# Patient Record
Sex: MALE | Race: WHITE | NOT HISPANIC OR LATINO | Employment: OTHER | URBAN - METROPOLITAN AREA
[De-identification: names, ages, dates, MRNs, and addresses within clinical notes are randomized per-mention and may not be internally consistent; named-entity substitution may affect disease eponyms.]

---

## 2018-02-03 ENCOUNTER — HOSPITAL ENCOUNTER (EMERGENCY)
Facility: HOSPITAL | Age: 83
Discharge: HOME | End: 2018-02-04
Attending: EMERGENCY MEDICINE
Payer: MEDICARE

## 2018-02-03 VITALS
RESPIRATION RATE: 22 BRPM | DIASTOLIC BLOOD PRESSURE: 67 MMHG | SYSTOLIC BLOOD PRESSURE: 156 MMHG | HEART RATE: 80 BPM | TEMPERATURE: 98 F | OXYGEN SATURATION: 96 %
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
## 2018-02-03 DIAGNOSIS — S09.90XA TRAUMATIC INJURY OF HEAD, INITIAL ENCOUNTER: ICD-10-CM

## 2018-02-03 PROCEDURE — 99285 EMERGENCY DEPT VISIT HI MDM: CPT | Mod: ,,, | Performed by: EMERGENCY MEDICINE

## 2018-02-03 PROCEDURE — 93010 ELECTROCARDIOGRAM REPORT: CPT | Mod: ,,, | Performed by: INTERNAL MEDICINE

## 2018-02-03 PROCEDURE — 93005 ELECTROCARDIOGRAM TRACING: CPT

## 2018-02-03 PROCEDURE — 99284 EMERGENCY DEPT VISIT MOD MDM: CPT | Mod: 25

## 2018-02-04 NOTE — ED PROVIDER NOTES
Encounter Date: 2/3/2018    SCRIBE #1 NOTE: I, Sabrina Gaffney, am scribing for, and in the presence of,  Dr. Mckeon. I have scribed the entire note.       History     Chief Complaint   Patient presents with    Fall     pt presents to the ed following a fall. pt has hematoma to right orbit.. no loc      Time patient was seen by the provider: 8:14 PM      The patient is a 89 y.o. male with co-morbidities including: arthritis, cancer, HTN, HLD, CAD, dementia, and seizures, who presents to the ED with a complaint of a fall and a right cheek hematoma. He has dementia and does not remember the fall. He denies chest pain and SOB.        The history is provided by the patient, medical records and the EMS personnel. The history is limited by the condition of the patient. No  was used.     Review of patient's allergies indicates:  No Known Allergies  Past Medical History:   Diagnosis Date    Adjustment disorder with depressed mood     Arthritis     Atrioventricular block     Bipolar disorder     Bradycardia     Cancer     Cataract     OU    Coronary artery disease     Dementia     Depression     HEARING LOSS     High cholesterol     Hypertension     Orthostatic hypotension     Seizures      Past Surgical History:   Procedure Laterality Date    ADENOIDECTOMY      CARDIAC PACEMAKER PLACEMENT      COLONOSCOPY      TONSILLECTOMY       History reviewed. No pertinent family history.  Social History   Substance Use Topics    Smoking status: Former Smoker     Quit date: 3/13/1982    Smokeless tobacco: Never Used    Alcohol use No     Review of Systems   Unable to perform ROS: Dementia       Physical Exam     Initial Vitals [02/03/18 1927]   BP Pulse Resp Temp SpO2   (!) 160/81 102 18 98.2 °F (36.8 °C) 98 %      MAP       107.33         Physical Exam    Nursing note and vitals reviewed.  Constitutional: He appears well-developed and well-nourished. He is not diaphoretic. No distress.    HENT:   Mouth/Throat: Oropharynx is clear and moist.   Right inferior periorbital area ecchymosis. No other ecchymoses.    Eyes: Pupils are equal, round, and reactive to light.   Neck: Normal range of motion. Neck supple. No JVD present.   No midline cervical tenderness.   Cardiovascular: Normal rate, regular rhythm, normal heart sounds and intact distal pulses.   Pulmonary/Chest: Breath sounds normal. No respiratory distress. He has no wheezes. He has no rhonchi. He has no rales.   Abdominal: Soft. He exhibits no distension. There is no tenderness.   Musculoskeletal: Normal range of motion.   No hip instability. No peripheral edema.   Lymphadenopathy:     He has no cervical adenopathy.   Neurological: No cranial nerve deficit or sensory deficit.   Awake and can answer simple questions. Otherwise not oriented to place or time secondary to dementia. 4+/5 strength in lower extremities.    Skin: Skin is warm and dry.         ED Course   Procedures  Labs Reviewed - No data to display  EKG Readings: (Independently Interpreted)   Ventricular paced. Rate: 81.       X-Rays:   Independently Interpreted Readings:   Head CT: No acute intracranial hemorrhage or skull fracture.   Other Readings:  Cervical spine CT: no acute fracture or subluxation.  Maxillofacial CT: no acute fracture.    Medical Decision Making:   History:   Old Medical Records: I decided to obtain old medical records.  Initial Assessment:   Emergent 89 y.o. male status post fall presents with head injury. My differential diagnosis includes but is not limited to: intracranial hemorrhage, contusion, concussion, and cervical fracture. Patient appears to be at baseline mental status. CT imaging ordered. Will continue to monitor.    10:43 PM  Imaging reviewed with no significant abnormality. He appears to be at his baseline mental status. Vital signs are stable. Stable for discharge back to nursing facility.  Independently Interpreted Test(s):   I have ordered  and independently interpreted X-rays - see prior notes.  I have ordered and independently interpreted EKG Reading(s) - see prior notes  Clinical Tests:   Radiological Study: Ordered and Reviewed  Medical Tests: Ordered and Reviewed            Scribe Attestation:   Scribe #1: I performed the above scribed service and the documentation accurately describes the services I performed. I attest to the accuracy of the note.    Attending Attestation:           Physician Attestation for Scribe:      Comments: I, Dr. Kristy Mckeon, personally performed the services described in this documentation. All medical record entries made by the scribe were at my direction and in my presence.  I have reviewed the chart and agree that the record reflects my personal performance and is accurate and complete. Kristy Mckeon MD.              ED Course      Clinical Impression:   The primary encounter diagnosis was Fall, initial encounter. A diagnosis of Traumatic injury of head, initial encounter was also pertinent to this visit.    Disposition:   Disposition: Discharged  Condition: Stable                        Kristy Mckeon MD  02/04/18 1189

## 2018-02-04 NOTE — ED NOTES
Patient placed on continuous cardiac monitor, automatic blood pressure cuff and continuous pulse oximeter. Pt gowned

## 2018-02-04 NOTE — ED NOTES
Adult Physical Assessment  LOC: Jesus Beckham, 89 y.o. male verified via two identifiers.  The patient is awake, alert, oriented to time and self and speaking appropriately at this time.  APPEARANCE: Patient resting comfortably and appears to be in no acute distress at this time. Patient is clean and well groomed  SKIN:The skin is warm and dry, color consistent with ethnicity, patient has normal skin turgor and moist mucus membranes, skin intact, no breakdown or brusing noted.  MUSCULOSKELETAL: Patient moving all extremities well, swelling and ecchymosis noted to right knee, tender to touch  RESPIRATORY: Airway is open and patent, respirations are spontaneous, patient has a normal effort and rate, no accessory muscle use noted.  CARDIAC: Peripheral edema noted  ABDOMEN: Soft and non tender to palpation, no abdominal distention noted.  NEUROLOGIC: Eyes open spontaneously, follows commands, facial expression symmetrical, bilateral hand grasp equal and even, purposeful motor response noted, normal sensation in all extremities when touched with a finger.

## 2018-05-10 ENCOUNTER — HOSPITAL ENCOUNTER (EMERGENCY)
Facility: HOSPITAL | Age: 83
Discharge: HOME | End: 2018-05-11
Attending: EMERGENCY MEDICINE
Payer: MEDICARE

## 2018-05-10 DIAGNOSIS — S09.90XA INJURY OF HEAD, INITIAL ENCOUNTER: Primary | ICD-10-CM

## 2018-05-10 DIAGNOSIS — M25.552 LEFT HIP PAIN: ICD-10-CM

## 2018-05-10 DIAGNOSIS — M25.562 LEFT KNEE PAIN: ICD-10-CM

## 2018-05-10 DIAGNOSIS — M25.551 RIGHT HIP PAIN: ICD-10-CM

## 2018-05-10 PROCEDURE — 99285 EMERGENCY DEPT VISIT HI MDM: CPT | Mod: ,,, | Performed by: PHYSICIAN ASSISTANT

## 2018-05-10 PROCEDURE — 99284 EMERGENCY DEPT VISIT MOD MDM: CPT | Mod: 25

## 2018-05-11 VITALS
DIASTOLIC BLOOD PRESSURE: 84 MMHG | OXYGEN SATURATION: 98 % | WEIGHT: 160 LBS | SYSTOLIC BLOOD PRESSURE: 174 MMHG | BODY MASS INDEX: 22.32 KG/M2 | HEART RATE: 66 BPM | RESPIRATION RATE: 18 BRPM

## 2018-05-11 LAB
BUN SERPL-MCNC: 18 MG/DL (ref 6–30)
CHLORIDE SERPL-SCNC: 101 MMOL/L (ref 95–110)
CREAT SERPL-MCNC: 0.8 MG/DL (ref 0.5–1.4)
GLUCOSE SERPL-MCNC: 102 MG/DL (ref 70–110)
HCT VFR BLD CALC: 40 %PCV (ref 36–54)
POC IONIZED CALCIUM: 1.04 MMOL/L (ref 1.06–1.42)
POC TCO2 (MEASURED): 26 MMOL/L (ref 23–29)
POTASSIUM BLD-SCNC: 4.6 MMOL/L (ref 3.5–5.1)
SAMPLE: ABNORMAL
SODIUM BLD-SCNC: 137 MMOL/L (ref 136–145)

## 2018-05-11 NOTE — ED NOTES
SPD contacted for return transport to [facility]. Dispatch states that they will be here within.

## 2018-05-11 NOTE — DISCHARGE INSTRUCTIONS
Follow up with primary care doctor  Clean the wound on the head daily with soap and water Steri-Strips will fall off over the next couple of days  Return to the emergency department for any new symptoms    Our goal in the emergency department is to always give you outstanding care and exceptional service. You may receive a survey by mail or e-mail in the next week regarding your experience in our ED. We would greatly appreciate your completing and returning the survey. Your feedback provides us with a way to recognize our staff who give very good care and it helps us learn how to improve when your experience was below our aspiration of excellence.

## 2018-05-11 NOTE — ED NOTES
Pt arrives via EMS with c-collar in place. Pt reports fall and unaware how he fell. Pt disoriented to time. Pt denies head pain, dizziness, N/V, neck pain, back pain, chest pain, SOB. Pt hard of hearing.     Patient identifiers verified and correct for Jesus Beckham.    LOC: The patient is awake, alert and aware of environment with an appropriate affect, the patient is disoriented to time and speaking appropriately.  APPEARANCE: Patient resting comfortably and in no acute distress, patient is clean and well groomed, patient's clothing is properly fastened. Pt has c-collar in place.   SKIN: The skin is warm and dry, color consistent with ethnicity, patient has normal skin turgor and moist mucus membranes, skin intact, no breakdown or bruising noted.  MUSCULOSKELETAL: Patient moving all extremities spontaneously, no obvious swelling or deformities noted.  RESPIRATORY: Airway is open and patent, respirations are spontaneous, patient has a normal effort and rate, no accessory muscle use noted  CARDIAC: Patient has a normal rate and regular rhythm, no periphreal edema noted, capillary refill < 3 seconds.  ABDOMEN: Soft and non tender to palpation, no distention noted, normoactive bowel sounds present in all four quadrants.  NEUROLOGIC: PERRL, 3mm bilaterally, eyes open spontaneously, behavior appropriate to situation, follows commands, facial expression symmetrical, bilateral hand grasp equal and even, purposeful motor response noted, normal sensation in all extremities when touched with a finger.
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
## 2018-08-22 ENCOUNTER — HOSPITAL ENCOUNTER (EMERGENCY)
Facility: HOSPITAL | Age: 83
Discharge: HOME | End: 2018-08-22
Attending: EMERGENCY MEDICINE
Payer: MEDICARE

## 2018-08-22 VITALS
OXYGEN SATURATION: 99 % | BODY MASS INDEX: 23.8 KG/M2 | SYSTOLIC BLOOD PRESSURE: 158 MMHG | WEIGHT: 170 LBS | TEMPERATURE: 98 F | DIASTOLIC BLOOD PRESSURE: 66 MMHG | HEIGHT: 71 IN | HEART RATE: 60 BPM | RESPIRATION RATE: 16 BRPM

## 2018-08-22 DIAGNOSIS — T14.8XXA ABRASION: Primary | ICD-10-CM

## 2018-08-22 PROCEDURE — 99284 EMERGENCY DEPT VISIT MOD MDM: CPT

## 2018-08-22 PROCEDURE — 99283 EMERGENCY DEPT VISIT LOW MDM: CPT | Mod: ,,, | Performed by: EMERGENCY MEDICINE
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
## 2018-08-22 NOTE — ED NOTES
Patient d/c via wheelchair with KRISHAN escorting patient back to [facility]. Respirations even and unlabored. NO s/s of distress noted. Stable condition observed.

## 2018-09-21 ENCOUNTER — HOSPITAL ENCOUNTER (INPATIENT)
Facility: HOSPITAL | Age: 83
LOS: 4 days | Discharge: NURSING, REHAB OR LONG-TERM CARE | DRG: 918 | End: 2018-09-25
Attending: EMERGENCY MEDICINE | Admitting: EMERGENCY MEDICINE
Payer: MEDICARE

## 2018-09-21 DIAGNOSIS — Z71.89 GOALS OF CARE, COUNSELING/DISCUSSION: ICD-10-CM

## 2018-09-21 DIAGNOSIS — Z51.5 PALLIATIVE CARE ENCOUNTER: ICD-10-CM
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
## 2018-09-21 DIAGNOSIS — T42.0X4D: ICD-10-CM

## 2018-09-21 PROBLEM — T42.0X4A PHENYTOIN POISONING OF UNDETERMINED INTENT: Status: ACTIVE | Noted: 2018-09-21

## 2018-09-21 PROBLEM — R41.82 ALTERED MENTAL STATUS: Status: ACTIVE | Noted: 2018-09-21

## 2018-09-21 LAB
ALBUMIN SERPL BCP-MCNC: 3.6 G/DL
ALP SERPL-CCNC: 120 U/L
ALT SERPL W/O P-5'-P-CCNC: 19 U/L
ANION GAP SERPL CALC-SCNC: 11 MMOL/L
AST SERPL-CCNC: 34 U/L
BASOPHILS # BLD AUTO: 0.04 K/UL
BASOPHILS NFR BLD: 0.4 %
BILIRUB SERPL-MCNC: 0.6 MG/DL
BUN SERPL-MCNC: 14 MG/DL
CALCIUM SERPL-MCNC: 8.7 MG/DL
CHLORIDE SERPL-SCNC: 106 MMOL/L
CO2 SERPL-SCNC: 22 MMOL/L
CREAT SERPL-MCNC: 0.8 MG/DL
DIFFERENTIAL METHOD: ABNORMAL
EOSINOPHIL # BLD AUTO: 0.1 K/UL
EOSINOPHIL NFR BLD: 0.7 %
ERYTHROCYTE [DISTWIDTH] IN BLOOD BY AUTOMATED COUNT: 13.3 %
EST. GFR  (AFRICAN AMERICAN): >60 ML/MIN/1.73 M^2
EST. GFR  (NON AFRICAN AMERICAN): >60 ML/MIN/1.73 M^2
GLUCOSE SERPL-MCNC: 103 MG/DL
HCT VFR BLD AUTO: 42.9 %
HGB BLD-MCNC: 14.4 G/DL
IMM GRANULOCYTES # BLD AUTO: 0.04 K/UL
IMM GRANULOCYTES NFR BLD AUTO: 0.4 %
INR PPP: 1.1
LYMPHOCYTES # BLD AUTO: 1.9 K/UL
LYMPHOCYTES NFR BLD: 19.5 %
MCH RBC QN AUTO: 32.5 PG
MCHC RBC AUTO-ENTMCNC: 33.6 G/DL
MCV RBC AUTO: 97 FL
MONOCYTES # BLD AUTO: 1.5 K/UL
MONOCYTES NFR BLD: 15.6 %
NEUTROPHILS # BLD AUTO: 6.1 K/UL
NEUTROPHILS NFR BLD: 63.4 %
NRBC BLD-RTO: 0 /100 WBC
PHENYTOIN SERPL-MCNC: 26.2 UG/ML
PLATELET # BLD AUTO: 168 K/UL
PMV BLD AUTO: 10.2 FL
POCT GLUCOSE: 103 MG/DL (ref 70–110)
POTASSIUM SERPL-SCNC: 4.1 MMOL/L
PROT SERPL-MCNC: 7.6 G/DL
PROTHROMBIN TIME: 11.1 SEC
RBC # BLD AUTO: 4.43 M/UL
SODIUM SERPL-SCNC: 139 MMOL/L
TROPONIN I SERPL DL<=0.01 NG/ML-MCNC: <0.006 NG/ML
TSH SERPL DL<=0.005 MIU/L-ACNC: 1.25 UIU/ML
WBC # BLD AUTO: 9.67 K/UL

## 2018-09-21 PROCEDURE — 99284 EMERGENCY DEPT VISIT MOD MDM: CPT | Mod: ,,, | Performed by: EMERGENCY MEDICINE

## 2018-09-21 PROCEDURE — 99222 1ST HOSP IP/OBS MODERATE 55: CPT | Mod: ,,, | Performed by: HOSPITALIST

## 2018-09-21 PROCEDURE — 25000003 PHARM REV CODE 250: Performed by: STUDENT IN AN ORGANIZED HEALTH CARE EDUCATION/TRAINING PROGRAM

## 2018-09-21 PROCEDURE — 85025 COMPLETE CBC W/AUTO DIFF WBC: CPT

## 2018-09-21 PROCEDURE — 80053 COMPREHEN METABOLIC PANEL: CPT

## 2018-09-21 PROCEDURE — 85610 PROTHROMBIN TIME: CPT

## 2018-09-21 PROCEDURE — 80185 ASSAY OF PHENYTOIN TOTAL: CPT

## 2018-09-21 PROCEDURE — 84443 ASSAY THYROID STIM HORMONE: CPT

## 2018-09-21 PROCEDURE — 11000001 HC ACUTE MED/SURG PRIVATE ROOM

## 2018-09-21 PROCEDURE — 84484 ASSAY OF TROPONIN QUANT: CPT

## 2018-09-21 RX ORDER — IBUPROFEN 200 MG
16 TABLET ORAL
Status: DISCONTINUED | OUTPATIENT
Start: 2018-09-21 | End: 2018-09-25 | Stop reason: HOSPADM

## 2018-09-21 RX ORDER — GLUCAGON 1 MG
1 KIT INJECTION
Status: DISCONTINUED | OUTPATIENT
Start: 2018-09-21 | End: 2018-09-25 | Stop reason: HOSPADM

## 2018-09-21 RX ORDER — PHENYTOIN SODIUM 100 MG/1
200 CAPSULE, EXTENDED RELEASE ORAL 2 TIMES DAILY
Status: DISCONTINUED | OUTPATIENT
Start: 2018-09-21 | End: 2018-09-21

## 2018-09-21 RX ORDER — SERTRALINE HYDROCHLORIDE 25 MG/1
25 TABLET, FILM COATED ORAL NIGHTLY
Status: DISCONTINUED | OUTPATIENT
Start: 2018-09-21 | End: 2018-09-21

## 2018-09-21 RX ORDER — SODIUM CHLORIDE 0.9 % (FLUSH) 0.9 %
5 SYRINGE (ML) INJECTION
Status: DISCONTINUED | OUTPATIENT
Start: 2018-09-21 | End: 2018-09-25 | Stop reason: HOSPADM

## 2018-09-21 RX ORDER — TAMSULOSIN HYDROCHLORIDE 0.4 MG/1
0.4 CAPSULE ORAL DAILY
Status: DISCONTINUED | OUTPATIENT
Start: 2018-09-21 | End: 2018-09-25 | Stop reason: HOSPADM

## 2018-09-21 RX ORDER — ASCORBIC ACID 500 MG
500 TABLET ORAL NIGHTLY
Status: DISCONTINUED | OUTPATIENT
Start: 2018-09-21 | End: 2018-09-25 | Stop reason: HOSPADM

## 2018-09-21 RX ORDER — DONEPEZIL HYDROCHLORIDE 5 MG/1
5 TABLET, FILM COATED ORAL NIGHTLY
Status: DISCONTINUED | OUTPATIENT
Start: 2018-09-21 | End: 2018-09-25 | Stop reason: HOSPADM

## 2018-09-21 RX ORDER — IBUPROFEN 200 MG
24 TABLET ORAL
Status: DISCONTINUED | OUTPATIENT
Start: 2018-09-21 | End: 2018-09-25 | Stop reason: HOSPADM

## 2018-09-21 RX ORDER — QUETIAPINE FUMARATE 25 MG/1
25 TABLET, FILM COATED ORAL 2 TIMES DAILY
Status: DISCONTINUED | OUTPATIENT
Start: 2018-09-21 | End: 2018-09-21

## 2018-09-21 RX ORDER — LEVETIRACETAM 500 MG/1
500 TABLET ORAL 2 TIMES DAILY
Status: DISCONTINUED | OUTPATIENT
Start: 2018-09-21 | End: 2018-09-25 | Stop reason: HOSPADM

## 2018-09-21 RX ADMIN — LEVETIRACETAM 500 MG: 500 TABLET ORAL at 09:09

## 2018-09-21 RX ADMIN — OXYCODONE HYDROCHLORIDE AND ACETAMINOPHEN 500 MG: 500 TABLET ORAL at 09:09

## 2018-09-21 RX ADMIN — DONEPEZIL HYDROCHLORIDE 5 MG: 5 TABLET, FILM COATED ORAL at 09:09

## 2018-09-21 RX ADMIN — TAMSULOSIN HYDROCHLORIDE 0.4 MG: 0.4 CAPSULE ORAL at 01:09

## 2018-09-21 RX ADMIN — LEVETIRACETAM 500 MG: 500 TABLET ORAL at 01:09

## 2018-09-21 NOTE — NURSING
"Pt bed alarm went off.  Upon arrival to room, pt sitting on floor, no obvious signs of trauma, no LOC, no abrasions, bruising or lacerations noted.  Side rails up x 3.  Awake Alert and Oriented to person and place. VSS, stated he "had to get up to go to his house"  Primary MD paged to notify of fall.  "

## 2018-09-21 NOTE — ED NOTES
Hourly rounding complete. Patient resting in stretcher and is in NAD at this time. Pt is easily arouses to verbal stimuli, VSS, respirations even and unlabored. Pt denies pain at this time. Pt updated on POC. Bed low and locked with side rails up x2, call bell in pt reach. Pt voices no needs at this time.
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
## 2018-09-21 NOTE — ED NOTES
Hourly rounding complete. Patient resting in stretcher and is in NAD at this time. Pt is easily aroused to verbal stimuli. respirations even and unlabored. Pt denies pain at this time. Assisted pt. With bedpan and urinal. Pt updated on POC. Bed low and locked with side rails up x2, call bell in pt reach. Pt voices no needs at this time.

## 2018-09-21 NOTE — H&P
"[facility] Hospital Medicine  History & Physical    Patient Name: Jesus Beckham  MRN: [id number]  Admission Date: 9/21/2018  Attending Physician: Mala Peoples MD   Primary Care Provider: Tulio Tovar MD    [facility] Medicine Team: [facility] HOSP MED 4 Darwin Pearson MD     Patient information was obtained from patient and ER records.     Subjective:     Principal Problem:Fall    Chief Complaint:   Chief Complaint   Patient presents with    Fall     Pt presented to the via [facility]. Pt c/o AMS d/t increased pain medicine and patient fell out of the bed and hit his head. Pt has a hemotoma noted to right forehead.         HPI: 89M with CAD, dementia, bipolar d/o, seizure d/o, pacemaker (heart block) presents from nursing home with AMS and fall. Patient reportedly fell at nursing home today and unclear if LOC occurred. He states that he "tripped" getting out of bed and he did not hit his head, but he is confused and somnolent. He denies pain. Patient also presented to the ED yesterday for a fall while walking to the dining [place]mond, and previous note remarks that nursing staff feels he is more lethargic and cannot ambulate as well as normal. Staff denies seizure-like activity, LOC.  In ED, he was noted to have supratherapeutic phenytoin levels. CT-head and c-spine negative, but a hematoma to the R side of the head was seen.    Past Medical History:   Diagnosis Date    Adjustment disorder with depressed mood     Arthritis     Atrioventricular block     Bipolar disorder     Bradycardia     Cancer     Cataract     OU    Coronary artery disease     Dementia     Depression     HEARING LOSS     High cholesterol     Hypertension     Orthostatic hypotension     Seizures        Past Surgical History:   Procedure Laterality Date    ADENOIDECTOMY      CARDIAC PACEMAKER PLACEMENT      COLONOSCOPY      INSERTION-PACEMAKER-DUAL Left 12/28/2015    Performed by [facility]iel [facility] at [facility] CATH LAB "    TONSILLECTOMY         Review of patient's allergies indicates:  No Known Allergies    No current facility-administered medications on file prior to encounter.      Current Outpatient Medications on File Prior to Encounter   Medication Sig    acetaminophen (TYLENOL) 325 MG tablet Take 325 mg by mouth every 8 (eight) hours as needed for Pain.    ascorbic acid (VITAMIN C) 500 MG tablet Take 500 mg by mouth every evening.    bisacodyl (DULCOLAX, BISACODYL,) 10 mg Supp Place 10 mg rectally daily as needed.    CALCIUM CARBONATE (CALCIUM 600 ORAL) Take 2 tablets by mouth once daily.    donepezil (ARICEPT) 5 MG tablet Take 5 mg by mouth every evening.    lactulose (CHRONULAC) 10 gram/15 mL solution Take 10 g by mouth once daily.    levetiracetam (KEPPRA) 500 MG Tab Take 1 tablet (500 mg total) by mouth 2 (two) times daily. 1 Tablet Oral Twice a day    multivitamin (THERAGRAN) per tablet Take 1 tablet by mouth once daily.    phenytoin (DILANTIN) 100 MG ER capsule Take 2 capsules (200 mg total) by mouth 2 (two) times daily.    sertraline (ZOLOFT) 25 MG tablet Take 25 mg by mouth every evening.    quetiapine (SEROQUEL) 25 MG Tab Take 1 tablet (25 mg total) by mouth once daily. (Patient taking differently: Take 25 mg by mouth 2 (two) times daily. )    tamsulosin (FLOMAX) 0.4 mg Cp24 Take 1 capsule (0.4 mg total) by mouth once daily.     Family History     None        Tobacco Use    Smoking status: Former Smoker     Last attempt to quit: 3/13/1982     Years since quittin.5    Smokeless tobacco: Never Used   Substance and Sexual Activity    Alcohol use: No    Drug use: No    Sexual activity: No     Review of Systems   Unable to perform ROS: Dementia   Musculoskeletal: Negative for back pain and neck pain.   Neurological: Negative for headaches.     Objective:     Vital Signs (Most Recent):  Temp: 98.1 °F (36.7 °C) (18 1107)  Pulse: 66 (18 1045)  Resp: 20 (18 1100)  BP: (!) 156/70  (09/21/18 1045)  SpO2: 97 % (09/21/18 1045) Vital Signs (24h Range):  Temp:  [97.8 °F (36.6 °C)-98.5 °F (36.9 °C)] 98.1 °F (36.7 °C)  Pulse:  [66-88] 66  Resp:  [16-20] 20  SpO2:  [93 %-100 %] 97 %  BP: (124-166)/(59-90) 156/70     Weight: 74.8 kg (165 lb)  Body mass index is 25.09 kg/m².    Physical Exam   HENT:   Mouth/Throat: Oropharynx is clear and moist.   4cm hematoma, R forehead   Cardiovascular: Normal rate and regular rhythm. Exam reveals no gallop.   No murmur heard.  Pulmonary/Chest: Effort normal and breath sounds normal.   Abdominal: Soft. He exhibits no distension. There is no tenderness.   Musculoskeletal: He exhibits no edema or deformity.   Bruises on L and R elbows   Neurological:   Somnolent, arousable. Oriented to person and place.   Skin: Skin is warm. No rash noted. He is not diaphoretic. No erythema.   Psychiatric: He has a normal mood and affect.           Significant Labs:   CBC:   Recent Labs   Lab  09/20/18 1913 09/21/18   0905   WBC  8.86  9.67   HGB  15.0  14.4   HCT  44.6  42.9   PLT  199  168     CMP:   Recent Labs   Lab  09/20/18 1913 09/21/18   0905   NA  137  139   K  5.2*  4.1   CL  102  106   CO2  26  22*   GLU  127*  103   BUN  16  14   CREATININE  1.0  0.8   CALCIUM  9.2  8.7   PROT   --   7.6   ALBUMIN   --   3.6   BILITOT   --   0.6   ALKPHOS   --   120   AST   --   34   ALT   --   19   ANIONGAP  9  11   EGFRNONAA  >60.0  >60.0     Coagulation:   Recent Labs   Lab  09/21/18   0905   INR  1.1       Significant Imaging: I have reviewed all pertinent imaging results/findings within the past 24 hours.    Assessment/Plan:     * Fall    89M with dementia, seizure d/o, and pacemaker presents with recurrent falls in nursing home. No acute head or musculoskeletal trauma noted on physical exam. Negative head and c-spine CTs. No seizure-like activity noted. High phenytoin levels seen. Presentation most consistent with arrythmias, orthostasis, progression of dementia and debility,  possibly secondary to phenytoin toxicity.  - Will hold phenytoin and check level in AM  - Holding seroquel and zoloft due to lethargy  - PT/OT        Altered mental status    Patient more lethargic than baseline per nursing home staff. Differential includes progression of dementia, trauma, medication side effect, toxicity, CVA. No metabolic or new structural abnormalities noted.        Complete heart block    S/p pacemaker. EKG reveals paced rhythm        Dementia    Patient disoriented, with impaired memory.  - Continue donepezil        Seizure disorder    Holding phenytoin due to supratherapeutic levels  - Continue home Keppra          VTE Risk Mitigation (From admission, onward)        Ordered     Place ELIJAH hose  Until discontinued      09/21/18 1148     IP VTE HIGH RISK PATIENT  Once      09/21/18 1148     Place sequential compression device  Until discontinued      09/21/18 1108             Darwin Pearson MD  Department of Hospital Medicine   [facility]

## 2018-09-21 NOTE — ASSESSMENT & PLAN NOTE
89M with dementia, seizure d/o, and pacemaker presents with recurrent falls in nursing home. No acute head or musculoskeletal trauma noted on physical exam. Negative head and c-spine CTs. No seizure-like activity noted. High phenytoin levels seen. Presentation most consistent with arrythmias, orthostasis, progression of dementia and debility, possibly secondary to phenytoin toxicity.  - Will hold phenytoin and check level in AM  - Holding seroquel and zoloft due to lethargy  - PT/OT

## 2018-09-21 NOTE — ED TRIAGE NOTES
Pt. Discharged from [facility] yesterday to nursing home arrived to ED via EMS with CC of fall this morning. Nursing home states pt. Hits his head, denies LOC. Pt. Sleepy but arouses to verbal stimuli. Pt. Is alert to self and . Disoriented to situation and place. Denies HA CP and SOB.     Patient identifiers verified and correct for Jesus Beckham.    LOC: The patient is easily arouses with verbal stimuli. Pt is disoriented to place and situation.  Pt is speaking appropriately, no slurred speech.  APPEARANCE: Patient resting comfortably and in no acute distress. Pt is clean and well groomed. No JVD visible. Pt reports pain level of 0. Pt. Arrived with C Collar in place.   SKIN: Skin is warm dry bruising to left elbow and color is consistent with ethnicity. No tenting observed and capillary refill <3 seconds. No clubbing noted to nail beds. mucus membranes moist and acyanotic.  MUSCULOSKELETAL: Full range of motion present in bilat LUE and RUE. Hand  equal. LRE and LLE weakness sensation intact.   RESPIRATORY: Airway is open and patent. Respirations-unlabored, regular rate, equal bilaterally on inspiration and expiration. No accessory muscle use noted. Lungs clear to auscultation in all fields bilaterally anterior and posterior.   CARDIAC: Patient has regular heart rate and rhythm.  No peripheral edema noted, and patient has no c/o chest pain.  ABDOMEN: Soft and non-tender to palpation with no distention noted. Normoactive bowel sounds X4 quadrants. Pt has no complaints of abnormal bowel movements. Pt reports normal appetite.   NEUROLOGIC: Eyes open spontaneously and facial expression symmetrical. Pt behavior appropriate to situation, and pt follows commands.  Pt reports sensation present in all extremities when touched with a finger. . PERRLA  : No complaints of frequency, burning, urgency or blood in the urine.

## 2018-09-21 NOTE — ED NOTES
Tele box [id number] applied to pt. Shivani in war room states able to see pt on monitor, rhythm NSR with HR 71.

## 2018-09-21 NOTE — ED NOTES
Hourly rounding complete. Patient resting in stretcher and is in NAD at this time. Pt is easily arouses with verbal stimuli, VSS, respirations even and unlabored. Pt denies pain at this time. Pt updated on POC. Bed low and locked with side rails up x2, call bell in pt reach. Pt voices no needs at this time.

## 2018-09-21 NOTE — ED PROVIDER NOTES
"Encounter Date: 2018    SCRIBE #1 NOTE: I, Tessa Patel, am scribing for, and in the presence of,  Dr. Ibanez. I have scribed the following portions of the note - the APC attestation.       History     Chief Complaint   Patient presents with    Fall     Pt presented to the via [facility]. Pt c/o AMS d/t increased pain medicine and patient fell out of the bed and hit his head. Pt has a hemotoma noted to right forehead.      Patient is a 89-year-old male with a past medical history of CAD, bipolar disorder, seizure who presents the ED via EMS for fall and head trauma.  It was reported that patient fell as he was getting out of bed.  He has a hematoma noted to his right head.  Patient denies any pain.      Patient went to the ED yesterday for a fall as well and per ED note yesterday, nursing staff states that they noted a change in patient's baseline.  He has been more lethargic and "normally could ambulate and could participate in activities at the home."          Review of patient's allergies indicates:  No Known Allergies  Past Medical History:   Diagnosis Date    Adjustment disorder with depressed mood     Arthritis     Atrioventricular block     Bipolar disorder     Bradycardia     Cancer     Cataract     OU    Coronary artery disease     Dementia     Depression     HEARING LOSS     High cholesterol     Hypertension     Orthostatic hypotension     Seizures      Past Surgical History:   Procedure Laterality Date    ADENOIDECTOMY      CARDIAC PACEMAKER PLACEMENT      COLONOSCOPY      INSERTION-PACEMAKER-DUAL Left 2015    Performed by [facility]iel [facility] at [facility] CATH LAB    TONSILLECTOMY       History reviewed. No pertinent family history.  Social History     Tobacco Use    Smoking status: Former Smoker     Last attempt to quit: 3/13/1982     Years since quittin.5    Smokeless tobacco: Never Used   Substance Use Topics    Alcohol use: No    Drug use: No     Review of Systems "   Unable to perform ROS: Dementia       Physical Exam     Initial Vitals [09/21/18 0818]   BP Pulse Resp Temp SpO2   (!) 149/71 71 16 97.8 °F (36.6 °C) 98 %      MAP       --         Physical Exam    Nursing note and vitals reviewed.  Constitutional: He appears well-developed and well-nourished. Cervical collar in place.   HENT:   Head: Normocephalic and atraumatic.   Nose: Nose normal.   Eyes: Conjunctivae and EOM are normal. Right eye exhibits no nystagmus. Left eye exhibits no nystagmus. Pupils are unequal.   Neck: Normal range of motion. No spinous process tenderness present.   Cardiovascular: Normal rate, regular rhythm and normal heart sounds. Exam reveals no friction rub.    No murmur heard.  Pulmonary/Chest: Breath sounds normal. No respiratory distress. He has no wheezes. He has no rales.   No chest wall tenderness.    Abdominal: Soft. Bowel sounds are normal. He exhibits no distension and no mass. There is no tenderness. There is no rigidity and no guarding.   Musculoskeletal: Normal range of motion.   Limited ROM and strength of extremities, but symmetric. No focal bony tenderness.    Neurological: He is alert and oriented to person, place, and time. He has normal strength. No sensory deficit.   Alert and oriented to person, but not year or place. No facial asymmetry. No arm drift.    Skin: Skin is warm and dry. No erythema.   Psychiatric: He has a normal mood and affect. Thought content normal.         ED Course   Procedures  Labs Reviewed   CBC W/ AUTO DIFFERENTIAL - Abnormal; Notable for the following components:       Result Value    RBC 4.43 (*)     MCH 32.5 (*)     Mono # 1.5 (*)     Mono% 15.6 (*)     All other components within normal limits   COMPREHENSIVE METABOLIC PANEL - Abnormal; Notable for the following components:    CO2 22 (*)     All other components within normal limits   PHENYTOIN LEVEL, TOTAL - Abnormal; Notable for the following components:    Phenytoin Lvl 26.2 (*)     All other  components within normal limits   TSH   TROPONIN I   PROTIME-INR   POCT GLUCOSE   POCT GLUCOSE MONITORING CONTINUOUS          Imaging Results          CT Head Without Contrast (Final result)  Result time 09/21/18 10:09:07    Final result by Ayden Cisneros DO (09/21/18 10:09:07)                 Impression:      No significant change from prior.  Continued generalized cerebral volume loss.    No evidence for acute intracranial hemorrhage or new abnormal parenchymal attenuation allowing for patient motion.    Clinical correlation and further evaluation as warranted.      Electronically signed by: Ayden Cisneros DO  Date:    09/21/2018  Time:    10:09             Narrative:    EXAMINATION:  CT HEAD WITHOUT CONTRAST    CLINICAL HISTORY:  fall, head trauma;    TECHNIQUE:  Multiple sequential 5 mm axial images of the head without contrast.  Coronal and sagittal reformatted imaging from the axial acquisition.    COMPARISON:  09/20/2018    FINDINGS:  Study is limited by patient motion.  There is continued age-appropriate generalized cerebral volume loss.  There is compensatory enlargement of the ventricles sulci and cisterns similar to prior without evidence for hydrocephalus.    Allowing for motion limitation there is no definite acute intracranial hemorrhage or sulcal effacement to suggest large territory recent infarction.  No midline shift or mass effect.  Mild mucosal thickening maxillary antra with patchy ethmoid air cell opacities.  There is a small lobular opacity right maxillary antra suggestive for mucous retention cyst.                               CT Cervical Spine Without Contrast (Final result)  Result time 09/21/18 11:37:19    Final result by Ian Khan MD (09/21/18 11:37:19)                 Impression:      No acute fractures or traumatic subluxations.    Moderate cervical spondylosis, similar to prior examinations.    Stable 1.0 cm left lung apex pulmonary nodule and biapical scarring with emphysematous  changes.    Electronically signed by resident: Aneudy Mejía  Date:    09/21/2018  Time:    10:04    Electronically signed by: Ian Khan MD  Date:    09/21/2018  Time:    11:37             Narrative:    EXAMINATION:  CT CERVICAL SPINE WITHOUT CONTRAST    CLINICAL HISTORY:  fall out of bed, head trauma    TECHNIQUE:  Low dose axial images, sagittal and coronal reformations were performed though the cervical spine.  Contrast was not administered.    COMPARISON:  CT cervical spine 02/03/2018.    FINDINGS:  Cervical vertebral body heights are maintained.  Alignment is normal.  No acute fractures or traumatic subluxations.  Multilevel intervertebral disc space height loss.    C2-C3: Posterior disc osteophyte complex formation without significant spinal canal stenosis or neural foraminal narrowing.    C3-C4: Posterior disc osteophyte complex formation and uncovertebral spurring with severe left-sided facet arthropathy resulting in moderate to severe left neural foraminal narrowing and no spinal canal stenosis.    C4-C5: Posterior disc osteophyte complex formation and uncovertebral spurring with severe right-sided facet arthropathy resulting in moderate to severe right neural foraminal narrowing and no spinal canal stenosis.    C5-C6: Posterior disc osteophyte complex formation without spinal canal stenosis.  Mild bilateral neural foraminal narrowing seen bilaterally.    Stable 1.5 cm calcification in the right thyroid lobe, similar to 2004 examination.  Calcifications of the carotid bifurcations.  Stable 1.0 cm left lung apex pulmonary nodule a similar to 2/2018.  Right apical scarring and emphysematous lung changes are seen.                                 Medical Decision Making:   History:   Old Medical Records: I decided to obtain old medical records.  Clinical Tests:   Lab Tests: Ordered and Reviewed  Radiological Study: Ordered and Reviewed       APC / Resident Notes:   Patient is a 89 year old male that  presents to the ED fall.    POCT glucose 103.  CBC with no leukocytosis or anemia.  CMP with no electrolyte abn. Cr wnl at 0.8.  TSH wnl at 1.2.   Troponin negative.  Phenytoin level decrease but still elevated at 26.2. Was 31.5 yesterday.  CT head with no significant change from yesterday's CT.  Continued generalized cerebral volume loss.  CT cervical spine with no acute fractures.     C-collar removed. Patient will be admitted to Hospital Medicine for further evaluation and management of phenytoin toxicity and risk for falling.        Sima Mantilla PA-C  Emergent Department  [facility]  Spectralink #[id number] or #[id number]         Scribe Attestation:   Scribe #1: I performed the above scribed service and the documentation accurately describes the services I performed. I attest to the accuracy of the note.    Attending Attestation:     Physician Attestation Statement for NP/PA:   I have conducted a face to face encounter with this patient in addition to the NP/PA, due to Medical Complexity    Other NP/PA Attestation Additions:    History of Present Illness: 89 y.o. Male patient with recent diagnosis of elevated dilantin level, presents to ED via EMS for an evaluation of fall with head trauma today.                       Clinical Impression:   The primary encounter diagnosis was Phenytoin poisoning of undetermined intent, subsequent encounter. A diagnosis of Fall, subsequent encounter was also pertinent to this visit.      Disposition:   Disposition: Admitted                        Sima Mantilla PA-C  09/21/18 1300

## 2018-09-21 NOTE — SUBJECTIVE & OBJECTIVE
Past Medical History:   Diagnosis Date    Adjustment disorder with depressed mood     Arthritis     Atrioventricular block     Bipolar disorder     Bradycardia     Cancer     Cataract     OU    Coronary artery disease     Dementia     Depression     HEARING LOSS     High cholesterol     Hypertension     Orthostatic hypotension     Seizures        Past Surgical History:   Procedure Laterality Date    ADENOIDECTOMY      CARDIAC PACEMAKER PLACEMENT      COLONOSCOPY      INSERTION-PACEMAKER-DUAL Left 12/28/2015    Performed by [facility] at [facility] CATH LAB    TONSILLECTOMY         Review of patient's allergies indicates:  No Known Allergies    No current facility-administered medications on file prior to encounter.      Current Outpatient Medications on File Prior to Encounter   Medication Sig    acetaminophen (TYLENOL) 325 MG tablet Take 325 mg by mouth every 8 (eight) hours as needed for Pain.    ascorbic acid (VITAMIN C) 500 MG tablet Take 500 mg by mouth every evening.    bisacodyl (DULCOLAX, BISACODYL,) 10 mg Supp Place 10 mg rectally daily as needed.    CALCIUM CARBONATE (CALCIUM 600 ORAL) Take 2 tablets by mouth once daily.    donepezil (ARICEPT) 5 MG tablet Take 5 mg by mouth every evening.    lactulose (CHRONULAC) 10 gram/15 mL solution Take 10 g by mouth once daily.    levetiracetam (KEPPRA) 500 MG Tab Take 1 tablet (500 mg total) by mouth 2 (two) times daily. 1 Tablet Oral Twice a day    multivitamin (THERAGRAN) per tablet Take 1 tablet by mouth once daily.    phenytoin (DILANTIN) 100 MG ER capsule Take 2 capsules (200 mg total) by mouth 2 (two) times daily.    sertraline (ZOLOFT) 25 MG tablet Take 25 mg by mouth every evening.    quetiapine (SEROQUEL) 25 MG Tab Take 1 tablet (25 mg total) by mouth once daily. (Patient taking differently: Take 25 mg by mouth 2 (two) times daily. )    tamsulosin (FLOMAX) 0.4 mg Cp24 Take 1 capsule (0.4 mg total) by mouth once daily.      Family History     None        Tobacco Use    Smoking status: Former Smoker     Last attempt to quit: 3/13/1982     Years since quittin.5    Smokeless tobacco: Never Used   Substance and Sexual Activity    Alcohol use: No    Drug use: No    Sexual activity: No     Review of Systems   Unable to perform ROS: Dementia   Musculoskeletal: Negative for back pain and neck pain.   Neurological: Negative for headaches.     Objective:     Vital Signs (Most Recent):  Temp: 98.1 °F (36.7 °C) (18 1107)  Pulse: 66 (18 1045)  Resp: 20 (18 1100)  BP: (!) 156/70 (18 1045)  SpO2: 97 % (18 1045) Vital Signs (24h Range):  Temp:  [97.8 °F (36.6 °C)-98.5 °F (36.9 °C)] 98.1 °F (36.7 °C)  Pulse:  [66-88] 66  Resp:  [16-20] 20  SpO2:  [93 %-100 %] 97 %  BP: (124-166)/(59-90) 156/70     Weight: 74.8 kg (165 lb)  Body mass index is 25.09 kg/m².    Physical Exam   HENT:   Mouth/Throat: Oropharynx is clear and moist.   4cm hematoma, R forehead   Cardiovascular: Normal rate and regular rhythm. Exam reveals no gallop.   No murmur heard.  Pulmonary/Chest: Effort normal and breath sounds normal.   Abdominal: Soft. He exhibits no distension. There is no tenderness.   Musculoskeletal: He exhibits no edema or deformity.   Bruises on L and R elbows   Neurological:   Somnolent, arousable. Oriented to person and place.   Skin: Skin is warm. No rash noted. He is not diaphoretic. No erythema.   Psychiatric: He has a normal mood and affect.           Significant Labs:   CBC:   Recent Labs   Lab  18   0905   WBC  8.86  9.67   HGB  15.0  14.4   HCT  44.6  42.9   PLT  199  168     CMP:   Recent Labs   Lab  18   0905   NA  137  139   K  5.2*  4.1   CL  102  106   CO2  26  22*   GLU  127*  103   BUN  16  14   CREATININE  1.0  0.8   CALCIUM  9.2  8.7   PROT   --   7.6   ALBUMIN   --   3.6   BILITOT   --   0.6   ALKPHOS   --   120   AST   --   34   ALT   --   19   ANIONGAP  9   11   EGFRNONAA  >60.0  >60.0     Coagulation:   Recent Labs   Lab  09/21/18   0905   INR  1.1       Significant Imaging: I have reviewed all pertinent imaging results/findings within the past 24 hours.

## 2018-09-22 LAB
25(OH)D3+25(OH)D2 SERPL-MCNC: 28 NG/ML
ALBUMIN SERPL BCP-MCNC: 3.3 G/DL
ALP SERPL-CCNC: 103 U/L
ALT SERPL W/O P-5'-P-CCNC: 17 U/L
ANION GAP SERPL CALC-SCNC: 12 MMOL/L
AST SERPL-CCNC: 30 U/L
BASOPHILS # BLD AUTO: 0.03 K/UL
BASOPHILS NFR BLD: 0.4 %
BILIRUB SERPL-MCNC: 0.6 MG/DL
BUN SERPL-MCNC: 15 MG/DL
CALCIUM SERPL-MCNC: 8.4 MG/DL
CHLORIDE SERPL-SCNC: 108 MMOL/L
CO2 SERPL-SCNC: 21 MMOL/L
CREAT SERPL-MCNC: 0.8 MG/DL
DIFFERENTIAL METHOD: ABNORMAL
EOSINOPHIL # BLD AUTO: 0.5 K/UL
EOSINOPHIL NFR BLD: 5.8 %
ERYTHROCYTE [DISTWIDTH] IN BLOOD BY AUTOMATED COUNT: 13.3 %
EST. GFR  (AFRICAN AMERICAN): >60 ML/MIN/1.73 M^2
EST. GFR  (NON AFRICAN AMERICAN): >60 ML/MIN/1.73 M^2
GLUCOSE SERPL-MCNC: 81 MG/DL
HCT VFR BLD AUTO: 40.8 %
HGB BLD-MCNC: 13.9 G/DL
IMM GRANULOCYTES # BLD AUTO: 0.02 K/UL
IMM GRANULOCYTES NFR BLD AUTO: 0.3 %
LYMPHOCYTES # BLD AUTO: 2.1 K/UL
LYMPHOCYTES NFR BLD: 27.2 %
MCH RBC QN AUTO: 32.1 PG
MCHC RBC AUTO-ENTMCNC: 34.1 G/DL
MCV RBC AUTO: 94 FL
MONOCYTES # BLD AUTO: 1 K/UL
MONOCYTES NFR BLD: 12.8 %
NEUTROPHILS # BLD AUTO: 4.2 K/UL
NEUTROPHILS NFR BLD: 53.5 %
NRBC BLD-RTO: 0 /100 WBC
PHENYTOIN SERPL-MCNC: 23.2 UG/ML
PLATELET # BLD AUTO: 168 K/UL
PMV BLD AUTO: 10.5 FL
POTASSIUM SERPL-SCNC: 3.7 MMOL/L
PROT SERPL-MCNC: 6.9 G/DL
RBC # BLD AUTO: 4.33 M/UL
SODIUM SERPL-SCNC: 141 MMOL/L
WBC # BLD AUTO: 7.87 K/UL

## 2018-09-22 PROCEDURE — 85025 COMPLETE CBC W/AUTO DIFF WBC: CPT

## 2018-09-22 PROCEDURE — 80053 COMPREHEN METABOLIC PANEL: CPT

## 2018-09-22 PROCEDURE — 36415 COLL VENOUS BLD VENIPUNCTURE: CPT

## 2018-09-22 PROCEDURE — 11000001 HC ACUTE MED/SURG PRIVATE ROOM

## 2018-09-22 PROCEDURE — 80185 ASSAY OF PHENYTOIN TOTAL: CPT

## 2018-09-22 PROCEDURE — 25000003 PHARM REV CODE 250: Performed by: STUDENT IN AN ORGANIZED HEALTH CARE EDUCATION/TRAINING PROGRAM

## 2018-09-22 PROCEDURE — 99232 SBSQ HOSP IP/OBS MODERATE 35: CPT | Mod: ,,, | Performed by: HOSPITALIST

## 2018-09-22 PROCEDURE — 82306 VITAMIN D 25 HYDROXY: CPT

## 2018-09-22 RX ORDER — POTASSIUM CHLORIDE 20 MEQ/1
20 TABLET, EXTENDED RELEASE ORAL NIGHTLY
COMMUNITY

## 2018-09-22 RX ORDER — CLOTRIMAZOLE AND BETAMETHASONE DIPROPIONATE 10; .64 MG/G; MG/G
CREAM TOPICAL
COMMUNITY

## 2018-09-22 RX ORDER — CHLORHEXIDINE GLUCONATE ORAL RINSE 1.2 MG/ML
SOLUTION DENTAL
COMMUNITY

## 2018-09-22 RX ORDER — CARVEDILOL 3.12 MG/1
3.12 TABLET ORAL 2 TIMES DAILY
COMMUNITY

## 2018-09-22 RX ORDER — ATORVASTATIN CALCIUM 40 MG/1
40 TABLET, FILM COATED ORAL NIGHTLY
COMMUNITY

## 2018-09-22 RX ADMIN — TAMSULOSIN HYDROCHLORIDE 0.4 MG: 0.4 CAPSULE ORAL at 08:09

## 2018-09-22 RX ADMIN — DONEPEZIL HYDROCHLORIDE 5 MG: 5 TABLET, FILM COATED ORAL at 09:09

## 2018-09-22 RX ADMIN — LEVETIRACETAM 500 MG: 500 TABLET ORAL at 09:09

## 2018-09-22 RX ADMIN — LEVETIRACETAM 500 MG: 500 TABLET ORAL at 08:09

## 2018-09-22 RX ADMIN — OXYCODONE HYDROCHLORIDE AND ACETAMINOPHEN 500 MG: 500 TABLET ORAL at 09:09

## 2018-09-22 NOTE — ASSESSMENT & PLAN NOTE
89M with dementia, seizure d/o, and pacemaker presents with recurrent falls in nursing home. No acute head or musculoskeletal trauma noted on physical exam. Negative head and c-spine CTs, negative hip X-ray. No seizure-like activity noted. High phenytoin levels seen. Presentation most consistent with arrythmias, orthostasis, progression of dementia and debility, possibly secondary to phenytoin toxicity.  - Patient continues to try to get out of bed on his own  - Continue holding phenytoin due to levels remaining high  - Holding seroquel and zoloft due to lethargy  - PT/OT   Include Z78.9 (Other Specified Conditions Influencing Health Status) As An Associated Diagnosis?: Yes Anesthesia Volume In Cc: 0 Medical Necessity Information: It is in your best interest to select a reason for this procedure from the list below. All of these items fulfill various CMS LCD requirements except the new and changing color options. Post-Care Instructions: I reviewed with the patient in detail post-care instructions. Patient is to wear sunprotection, and avoid picking at any of the treated lesions. Pt may apply Vaseline to crusted or scabbing areas. Medical Necessity Clause: This procedure was medically necessary because the lesions that were treated were: Detail Level: Simple Consent: The patient's consent was obtained including but not limited to risks of crusting, scabbing, blistering, scarring, darker or lighter pigmentary change, recurrence, incomplete removal and infection. Add 52 Modifier (Optional): no

## 2018-09-22 NOTE — SUBJECTIVE & OBJECTIVE
Interval History: Fall overnight. Complains of R hip pain. No bruising or hematoma, negative hip X-ray. No other events. Patient more alert and conversational this AM, but not oriented to time. Tangential and repeats himself. Telesitter placed.    Objective:     Vital Signs (Most Recent):  Temp: 98.3 °F (36.8 °C) (09/22/18 1158)  Pulse: 76 (09/22/18 1158)  Resp: 18 (09/22/18 1158)  BP: (!) 140/65 (09/22/18 1158)  SpO2: 98 % (09/22/18 1158) Vital Signs (24h Range):  Temp:  [97.1 °F (36.2 °C)-98.9 °F (37.2 °C)] 98.3 °F (36.8 °C)  Pulse:  [62-81] 76  Resp:  [15-20] 18  SpO2:  [95 %-100 %] 98 %  BP: (130-171)/(65-88) 140/65     Weight: 72.6 kg (160 lb)  Body mass index is 22.32 kg/m².    Intake/Output Summary (Last 24 hours) at 9/22/2018 1226  Last data filed at 9/21/2018 1400  Gross per 24 hour   Intake --   Output 400 ml   Net -400 ml      Physical Exam   HENT:   Mouth/Throat: Oropharynx is clear and moist.   4cm hematoma, R forehead   Cardiovascular: Normal rate and regular rhythm. Exam reveals no gallop.   No murmur heard.  Pulmonary/Chest: Effort normal and breath sounds normal.   Abdominal: Soft. He exhibits no distension. There is no tenderness.   Musculoskeletal: He exhibits no edema or deformity.   Bruises on L and R elbows   Neurological:   Somnolent, arousable. More alert than yesterday. Oriented to person and place.   Skin: Skin is warm. No rash noted. He is not diaphoretic. No erythema.   Psychiatric: He has a normal mood and affect.       Significant Labs:   CBC:   Recent Labs   Lab  09/20/18   1913  09/21/18   0905  09/22/18   0358   WBC  8.86  9.67  7.87   HGB  15.0  14.4  13.9*   HCT  44.6  42.9  40.8   PLT  199  168  168     CMP:   Recent Labs   Lab  09/20/18   1913  09/21/18   0905  09/22/18   0358   NA  137  139  141   K  5.2*  4.1  3.7   CL  102  106  108   CO2  26  22*  21*   GLU  127*  103  81   BUN  16  14  15   CREATININE  1.0  0.8  0.8   CALCIUM  9.2  8.7  8.4*   PROT   --   7.6  6.9   ALBUMIN    --   3.6  3.3*   BILITOT   --   0.6  0.6   ALKPHOS   --   120  103   AST   --   34  30   ALT   --   19  17   ANIONGAP  9  11  12   EGFRNONAA  >60.0  >60.0  >60.0       Significant Imaging: I have reviewed all pertinent imaging results/findings within the past 24 hours.

## 2018-09-22 NOTE — NURSING
Did not stand pt during orthostatics because he was very unsteady and has a recent history of falls

## 2018-09-22 NOTE — PLAN OF CARE
Problem: Fall Risk (Adult)  Goal: Identify Related Risk Factors and Signs and Symptoms  Related risk factors and signs and symptoms are identified upon initiation of Human Response Clinical Practice Guideline (CPG)  Outcome: Ongoing (interventions implemented as appropriate)   09/21/18 2058   Fall Risk   Related Risk Factors (Fall Risk) age-related changes;bladder function altered;confusion/agitation;fatigue/slow reaction;fear of falling;gait/mobility problems;history of falls;impaired vision;inadequate lighting;neuro disease/injury;objects hard to reach;sensory deficits;sleep pattern alteration;environment unfamiliar   Signs and Symptoms (Fall Risk) presence of risk factors       Problem: Patient Care Overview  Goal: Plan of Care Review  Outcome: Ongoing (interventions implemented as appropriate)   09/21/18 2058   Coping/Psychosocial   Plan Of Care Reviewed With patient

## 2018-09-22 NOTE — PHARMACY MED REC
"Admission Medication Reconciliation - Pharmacy Consult Note    The home medication history was taken by Bambi Ragland, Pharmacy Technician.  Based on information gathered and subsequent review by the clinical pharmacist, the items below may need attention.     You may go to "Admission" then "Reconcile Home Medications" tabs to review and/or act upon these items.     Potentially problematic discrepancies with current MAR  o Patient IS taking the following which was not ordered upon admit  o Carvedilol 3.125 mg BID   o Atorvastatin 40 mg daily   o Calcium Carbonate 1200 mg daily   o Lactulose 10g/15mL Take 10 g daily   o Chlorhexidine 0.12% swish and spit every evening   o Multivitamin 1 tablet daily   o Linzess 72 mcg daily -- non-formulary, patient from nursing home may consider holding while inpatient if not clinically indicated to continue inpatient as may be difficult to attain home supply from nursing home    Potential issues to be addressed PRIOR TO DISCHARGE  o Phenytoin 300 mg BID at nursing home -- currently adjusted to 150mg BID for supratherapeutic level - recommend trough level after 7-10 days of new dose     Please address this information as you see fit.  Feel free to contact us if you have any questions or require assistance.    Kathi Casanova, PharmD  PGY-2 Internal Medicine Pharmacy Resident  [phone]                      .    .            "

## 2018-09-22 NOTE — HOSPITAL COURSE
Patient with a fall overnight  without injury. Hip X-ray negative. On further discussion, patient states that he is at the end of his life, and is ready to be with his  wife. Discussed the concept of hospice with the patient, and he liked the idea of not returning to the hospital frequently. Will attempt to contact son to discuss this, and will place Palliative consult in the morning. On the morning of , phenytoin level was therapeutic. Pt started on 150 mg BID (). Plan to recheck phenytoin levels this evening .

## 2018-09-22 NOTE — PROGRESS NOTES
"[facility] Hospital Medicine  Progress Note    Patient Name: Jesus Beckham  MRN: [id number]  Patient Class: IP- Inpatient   Admission Date: 9/21/2018  Length of Stay: 1 days  Attending Physician: Mala Peoples MD  Primary Care Provider: Tulio Tovar MD    [facility] Medicine Team: [facility] 4 Darwin Pearson MD    Subjective:     Principal Problem:Fall    HPI:  89M with CAD, dementia, bipolar d/o, seizure d/o, pacemaker (heart block) presents from nursing home with AMS and fall. Patient reportedly fell at nursing home today and unclear if LOC occurred. He states that he "tripped" getting out of bed and he did not hit his head, but he is confused and somnolent. He denies pain. Patient also presented to the ED yesterday for a fall while walking to the dining [place]mond, and previous note remarks that nursing staff feels he is more lethargic and cannot ambulate as well as normal. Staff denies seizure-like activity, LOC.  In ED, he was noted to have supratherapeutic phenytoin levels. CT-head and c-spine negative, but a hematoma to the R side of the head was seen.    Hospital Course:  Patient with a fall overnight 9/22 without injury. Hip X-ray negative.    Interval History: Fall overnight. Complains of R hip pain. No bruising or hematoma, negative hip X-ray. No other events. Patient more alert and conversational this AM, but not oriented to time. Tangential and repeats himself. Telesitter placed.    Objective:     Vital Signs (Most Recent):  Temp: 98.3 °F (36.8 °C) (09/22/18 1158)  Pulse: 76 (09/22/18 1158)  Resp: 18 (09/22/18 1158)  BP: (!) 140/65 (09/22/18 1158)  SpO2: 98 % (09/22/18 1158) Vital Signs (24h Range):  Temp:  [97.1 °F (36.2 °C)-98.9 °F (37.2 °C)] 98.3 °F (36.8 °C)  Pulse:  [62-81] 76  Resp:  [15-20] 18  SpO2:  [95 %-100 %] 98 %  BP: (130-171)/(65-88) 140/65     Weight: 72.6 kg (160 lb)  Body mass index is 22.32 kg/m².    Intake/Output Summary (Last 24 hours) at 9/22/2018 " 1226  Last data filed at 9/21/2018 1400  Gross per 24 hour   Intake --   Output 400 ml   Net -400 ml      Physical Exam   HENT:   Mouth/Throat: Oropharynx is clear and moist.   4cm hematoma, R forehead   Cardiovascular: Normal rate and regular rhythm. Exam reveals no gallop.   No murmur heard.  Pulmonary/Chest: Effort normal and breath sounds normal.   Abdominal: Soft. He exhibits no distension. There is no tenderness.   Musculoskeletal: He exhibits no edema or deformity.   Bruises on L and R elbows   Neurological:   Somnolent, arousable. More alert than yesterday. Oriented to person and place.   Skin: Skin is warm. No rash noted. He is not diaphoretic. No erythema.   Psychiatric: He has a normal mood and affect.       Significant Labs:   CBC:   Recent Labs   Lab  09/20/18 1913 09/21/18   0905 09/22/18   0358   WBC  8.86  9.67  7.87   HGB  15.0  14.4  13.9*   HCT  44.6  42.9  40.8   PLT  199  168  168     CMP:   Recent Labs   Lab  09/20/18 1913 09/21/18   0905 09/22/18   0358   NA  137  139  141   K  5.2*  4.1  3.7   CL  102  106  108   CO2  26  22*  21*   GLU  127*  103  81   BUN  16  14  15   CREATININE  1.0  0.8  0.8   CALCIUM  9.2  8.7  8.4*   PROT   --   7.6  6.9   ALBUMIN   --   3.6  3.3*   BILITOT   --   0.6  0.6   ALKPHOS   --   120  103   AST   --   34  30   ALT   --   19  17   ANIONGAP  9  11  12   EGFRNONAA  >60.0  >60.0  >60.0       Significant Imaging: I have reviewed all pertinent imaging results/findings within the past 24 hours.    Assessment/Plan:      * Fall    89M with dementia, seizure d/o, and pacemaker presents with recurrent falls in nursing home. No acute head or musculoskeletal trauma noted on physical exam. Negative head and c-spine CTs, negative hip X-ray. No seizure-like activity noted. High phenytoin levels seen. Presentation most consistent with arrythmias, orthostasis, progression of dementia and debility, possibly secondary to phenytoin toxicity.  - Patient continues to try to  get out of bed on his own  - Continue holding phenytoin due to levels remaining high  - Holding seroquel and zoloft due to lethargy  - PT/OT        Altered mental status    Patient more lethargic than baseline per nursing home staff. Differential includes progression of dementia, trauma, medication side effect, toxicity, CVA. No metabolic or new structural abnormalities noted.        Complete heart block    S/p pacemaker. EKG reveals paced rhythm        Dementia    Patient disoriented, with impaired memory.  - Continue donepezil        Seizure disorder    Holding phenytoin due to supratherapeutic levels  - Continue home Keppra          VTE Risk Mitigation (From admission, onward)        Ordered     Place ELIJAH hose  Until discontinued      09/21/18 1148     IP VTE HIGH RISK PATIENT  Once      09/21/18 1148     Place sequential compression device  Until discontinued      09/21/18 1108              Darwin Pearson MD  Department of Hospital Medicine   [facility]

## 2018-09-22 NOTE — SIGNIFICANT EVENT
Received call from RN that patient fell in his room, despite bed alarm. He got up from his bed and tripped before medical staff could reach him. Per RN, no apparent head trauma, LOC, patient oriented to person and place, not time which is consistent with earlier evaluation. Patient is reporting right hip pain, which he reported on presentation to the hospital. Transport already arrived to take patient to XR for bilateral hip films which were ordered at admission.     Telesitter will be provided for patient.        González Riggins MD  Internal Medicine, PGY-2

## 2018-09-23 PROBLEM — Z75.8 DISCHARGE PLANNING ISSUES: Status: ACTIVE | Noted: 2018-09-23

## 2018-09-23 LAB
ALBUMIN SERPL BCP-MCNC: 3.1 G/DL
ALP SERPL-CCNC: 96 U/L
ALT SERPL W/O P-5'-P-CCNC: 16 U/L
ANION GAP SERPL CALC-SCNC: 12 MMOL/L
AST SERPL-CCNC: 26 U/L
BASOPHILS # BLD AUTO: 0.04 K/UL
BASOPHILS NFR BLD: 0.5 %
BILIRUB SERPL-MCNC: 0.7 MG/DL
BUN SERPL-MCNC: 17 MG/DL
CALCIUM SERPL-MCNC: 8.4 MG/DL
CHLORIDE SERPL-SCNC: 109 MMOL/L
CO2 SERPL-SCNC: 19 MMOL/L
CREAT SERPL-MCNC: 0.8 MG/DL
DIFFERENTIAL METHOD: ABNORMAL
EOSINOPHIL # BLD AUTO: 0.4 K/UL
EOSINOPHIL NFR BLD: 5.6 %
ERYTHROCYTE [DISTWIDTH] IN BLOOD BY AUTOMATED COUNT: 13.3 %
EST. GFR  (AFRICAN AMERICAN): >60 ML/MIN/1.73 M^2
EST. GFR  (NON AFRICAN AMERICAN): >60 ML/MIN/1.73 M^2
GLUCOSE SERPL-MCNC: 77 MG/DL
HCT VFR BLD AUTO: 42.8 %
HGB BLD-MCNC: 13.5 G/DL
IMM GRANULOCYTES # BLD AUTO: 0.01 K/UL
IMM GRANULOCYTES NFR BLD AUTO: 0.1 %
LYMPHOCYTES # BLD AUTO: 1.9 K/UL
LYMPHOCYTES NFR BLD: 25.4 %
MCH RBC QN AUTO: 32.3 PG
MCHC RBC AUTO-ENTMCNC: 31.5 G/DL
MCV RBC AUTO: 102 FL
MONOCYTES # BLD AUTO: 1 K/UL
MONOCYTES NFR BLD: 13.1 %
NEUTROPHILS # BLD AUTO: 4.1 K/UL
NEUTROPHILS NFR BLD: 55.3 %
NRBC BLD-RTO: 0 /100 WBC
PHENYTOIN SERPL-MCNC: 17.8 UG/ML
PLATELET # BLD AUTO: 154 K/UL
PMV BLD AUTO: 10.6 FL
POTASSIUM SERPL-SCNC: 3.6 MMOL/L
PROT SERPL-MCNC: 6.6 G/DL
RBC # BLD AUTO: 4.18 M/UL
SODIUM SERPL-SCNC: 140 MMOL/L
WBC # BLD AUTO: 7.47 K/UL

## 2018-09-23 PROCEDURE — 80053 COMPREHEN METABOLIC PANEL: CPT

## 2018-09-23 PROCEDURE — 99232 SBSQ HOSP IP/OBS MODERATE 35: CPT | Mod: ,,, | Performed by: HOSPITALIST

## 2018-09-23 PROCEDURE — 25000003 PHARM REV CODE 250

## 2018-09-23 PROCEDURE — G8979 MOBILITY GOAL STATUS: HCPCS | Mod: CJ

## 2018-09-23 PROCEDURE — 25000003 PHARM REV CODE 250: Performed by: STUDENT IN AN ORGANIZED HEALTH CARE EDUCATION/TRAINING PROGRAM

## 2018-09-23 PROCEDURE — 97165 OT EVAL LOW COMPLEX 30 MIN: CPT

## 2018-09-23 PROCEDURE — 11000001 HC ACUTE MED/SURG PRIVATE ROOM

## 2018-09-23 PROCEDURE — G8980 MOBILITY D/C STATUS: HCPCS | Mod: CK

## 2018-09-23 PROCEDURE — 36415 COLL VENOUS BLD VENIPUNCTURE: CPT

## 2018-09-23 PROCEDURE — 97161 PT EVAL LOW COMPLEX 20 MIN: CPT

## 2018-09-23 PROCEDURE — 80185 ASSAY OF PHENYTOIN TOTAL: CPT

## 2018-09-23 PROCEDURE — G8978 MOBILITY CURRENT STATUS: HCPCS | Mod: CK

## 2018-09-23 PROCEDURE — 85025 COMPLETE CBC W/AUTO DIFF WBC: CPT

## 2018-09-23 RX ORDER — ACETAMINOPHEN 325 MG/1
650 TABLET ORAL EVERY 6 HOURS PRN
Status: DISCONTINUED | OUTPATIENT
Start: 2018-09-23 | End: 2018-09-25 | Stop reason: HOSPADM

## 2018-09-23 RX ADMIN — LEVETIRACETAM 500 MG: 500 TABLET ORAL at 09:09

## 2018-09-23 RX ADMIN — EXTENDED PHENYTOIN SODIUM 150 MG: 30 CAPSULE ORAL at 11:09

## 2018-09-23 RX ADMIN — LEVETIRACETAM 500 MG: 500 TABLET ORAL at 08:09

## 2018-09-23 RX ADMIN — EXTENDED PHENYTOIN SODIUM 150 MG: 30 CAPSULE ORAL at 09:09

## 2018-09-23 RX ADMIN — ACETAMINOPHEN 650 MG: 325 TABLET ORAL at 09:09

## 2018-09-23 RX ADMIN — DONEPEZIL HYDROCHLORIDE 5 MG: 5 TABLET, FILM COATED ORAL at 09:09

## 2018-09-23 RX ADMIN — TAMSULOSIN HYDROCHLORIDE 0.4 MG: 0.4 CAPSULE ORAL at 08:09

## 2018-09-23 RX ADMIN — OXYCODONE HYDROCHLORIDE AND ACETAMINOPHEN 500 MG: 500 TABLET ORAL at 09:09

## 2018-09-23 NOTE — PLAN OF CARE
Problem: Fall Risk (Adult)  Goal: Absence of Falls  Patient will demonstrate the desired outcomes by discharge/transition of care.  Outcome: Ongoing (interventions implemented as appropriate)   09/23/18 1825   Fall Risk (Adult)   Absence of Falls making progress toward outcome      09/23/18 1825   Fall Risk (Adult)   Absence of Falls making progress toward outcome       Problem: Patient Care Overview  Goal: Plan of Care Review  Outcome: Ongoing (interventions implemented as appropriate)  Questions answered and concerns addressed. No evidence of learning because the patient is very confused.   09/23/18 1825   Coping/Psychosocial   Plan Of Care Reviewed With patient       Problem: Pressure Ulcer Risk (Kali Scale) (Adult,Obstetrics,Pediatric)  Goal: Skin Integrity  Patient will demonstrate the desired outcomes by discharge/transition of care.  Outcome: Ongoing (interventions implemented as appropriate)   09/23/18 1825   Pressure Ulcer Risk (Kali Scale) (Adult,Obstetrics,Pediatric)   Skin Integrity making progress toward outcome       Problem: Confusion, Acute (Adult)  Goal: Safety  Patient will demonstrate the desired outcomes by discharge/transition of care.  Outcome: Ongoing (interventions implemented as appropriate)  Bed alarm and telesitter in use; reoriented as needed.   09/23/18 1825   Confusion, Acute (Adult)   Safety making progress toward outcome      09/23/18 1825   Confusion, Acute (Adult)   Safety making progress toward outcome

## 2018-09-23 NOTE — SUBJECTIVE & OBJECTIVE
Interval History: NAEON. Awake and alert this morning. Oriented to self and place.     Review of Systems  Objective:     Vital Signs (Most Recent):  Temp: 97.5 °F (36.4 °C) (09/23/18 1117)  Pulse: 74 (09/23/18 1117)  Resp: 16 (09/23/18 1117)  BP: (!) 143/74 (09/23/18 1117)  SpO2: 95 % (09/23/18 1117) Vital Signs (24h Range):  Temp:  [97 °F (36.1 °C)-98.3 °F (36.8 °C)] 97.5 °F (36.4 °C)  Pulse:  [73-78] 74  Resp:  [16-18] 16  SpO2:  [93 %-97 %] 95 %  BP: (136-162)/(65-74) 143/74     Weight: 72.6 kg (160 lb)  Body mass index is 22.32 kg/m².    Intake/Output Summary (Last 24 hours) at 9/23/2018 1338  Last data filed at 9/22/2018 1847  Gross per 24 hour   Intake 400 ml   Output --   Net 400 ml      Physical Exam   Constitutional: No distress.   Pleasantly demented, elderly gentleman.   HENT:   Head: Normocephalic.   Facial abrasion healing well   Eyes: EOM are normal. No scleral icterus.   Neck: Normal range of motion.   Cardiovascular: Normal rate, regular rhythm, normal heart sounds and intact distal pulses.   Pulmonary/Chest: Effort normal and breath sounds normal.   Abdominal: Soft. He exhibits no distension. There is no tenderness.   Musculoskeletal: Normal range of motion. He exhibits no edema.   Neurological: He is alert.   Skin: Skin is warm and dry. Capillary refill takes less than 2 seconds.   Psychiatric: He has a normal mood and affect. His behavior is normal.       Significant Labs: All pertinent labs within the past 24 hours have been reviewed.    Significant Imaging: I have reviewed and interpreted all pertinent imaging results/findings within the past 24 hours.

## 2018-09-23 NOTE — ASSESSMENT & PLAN NOTE
Phenytoin dosing per primary problem  - Continue home Keppra  - No seizures during admission thus far

## 2018-09-23 NOTE — PLAN OF CARE
Problem: Fall Risk (Adult)  Goal: Absence of Falls  Patient will demonstrate the desired outcomes by discharge/transition of care.   09/22/18 1922   Fall Risk (Adult)   Absence of Falls making progress toward outcome       Problem: Patient Care Overview  Goal: Plan of Care Review  Outcome: Ongoing (interventions implemented as appropriate)  Questions answered,    09/22/18 1922   Coping/Psychosocial   Plan Of Care Reviewed With patient       Problem: Confusion, Acute (Adult)  Goal: Safety  Patient will demonstrate the desired outcomes by discharge/transition of care.  Outcome: Ongoing (interventions implemented as appropriate)  Patient very disoriented x3, reoriented as needed. Camera and bed alarm in use; will continue to monitor

## 2018-09-23 NOTE — PT/OT/SLP EVAL
Physical Therapy Evaluation    Patient Name:  Jesus Beckham   MRN:  [id number]    Recommendations:     Discharge Recommendations:  (HH PT in NH)   Discharge Equipment Recommendations: none   Barriers to discharge: None    Assessment:     Jesus Beckham is a 89 y.o. male admitted with a medical diagnosis of Fall.  He presents with the following impairments/functional limitations:  weakness, impaired self care skills, impaired functional mobilty, gait instability, impaired cognition Patient tolerated evaluation  well. Patient limited at this time by increased pain in L hip with MMT but pt able to don/doff socks with full hip flexion with no complaint of pain. Pt close to functional baseline at this time but would benefit from skilled PT services while in the hospital to prevent any further deconditioning. .  D/C rec return to NH with HH PT to cont to work on improved balance and safety awareness to help prevent future risk of falls.   .    Rehab Prognosis:  good; patient would benefit from acute skilled PT services to address these deficits and reach maximum level of function.      Recent Surgery: * No surgery found *      Plan:     During this hospitalization, patient to be seen 3 x/week to address the above listed problems via gait training, therapeutic activities, therapeutic exercises, neuromuscular re-education  · Plan of Care Expires:  10/22/18   Plan of Care Reviewed with: patient    Subjective     Communicated with RN prior to session.  Patient found supine upon PT entry to room, agreeable to evaluation.      Chief Complaint: pt concerned that PT was taking his walker  Patient comments/goals: to return home  Pain/Comfort:  · Pain Rating 1: (pain in L hip; no numerical value;)  · Location - Side 1: Bilateral    Patients cultural, spiritual, Muslim conflicts given the current situation: none    Living Environment:  Pt questionable historian  Prior to admission, patients level of function was mod ( I )  with RW in NH. Pt states that he was staying at an apartment PTA. .  Patient has the following equipment: (walker, rolling).  DME owned (not currently used): none.  Upon discharge, patient will have assistance from NH staff.    Objective:     Patient found with:       General Precautions: Standard, fall   Orthopedic Precautions:N/A   Braces: N/A     Exams:  · Cognitive Exam:  Patient is oriented to Person and Place  · Fine Motor Coordination: -       Intact  · Gross Motor Coordination:  WFL  · Postural Exam:  Patient presented with the following abnormalities: -       Rounded shoulders  · -       Forward head  · -       Posterior pelvic tilt  · Sensation: -       Intact  · RLE Strength: WFL  · LLE ROM: WFL  · LLE Strength: WFL    Functional Mobility:  · Bed Mobility:  Rolling Left:  contact guard assistance  · Rolling Right: minimum assistance  · Supine to Sit: moderate assistance  · Sit to Supine: moderate assistance  · Transfers:  Sit to Stand:  minimum assistance with no AD  · Gait: x 135 feet with RW; CGA, decreased step lenght and width; v/c for RW mgmt and increased stride length   · Balance: CGA for dynamic gait with RW for UE support    AM-PAC 6 CLICK MOBILITY  Total Score:18       Therapeutic Activities and Exercises:  ·  Whiteboard updated in patients room to current assistance level  · All of patients questions were answered within the scope of PT    Patient education  · Patient educated on the role of PT and POC  · Patient educated on importance  activity while in the hosptial per tolerance for improved endurance and to limit deconditioning   · Patient educated on safe transfers with nursing as appropriate  · Patient educated on energy conservation, pursed lip breathing  · Patient educated on proper transfer mechanics and safety        Patient left HOB elevated with all lines intact, call button in reach and bed alarm on.    GOALS:   Multidisciplinary Problems     Physical Therapy Goals        Problem:  Physical Therapy Goal    Goal Priority Disciplines Outcome Goal Variances Interventions   Physical Therapy Goal     PT, PT/OT Ongoing (interventions implemented as appropriate)     Description:  Goals to be met by: 10/3/18     Patient will increase functional independence with mobility by performin. Supine to sit with Contact Guard Assistance  2. Sit to supine with Contact Guard Assistance  3. Sit to stand transfer with Stand-by Assistance  4. Bed to chair transfer with Contact Guard Assistance using Rolling Walker  5. Gait  x 200 feet with Contact Guard Assistance using Rolling Walker.                       History:     Past Medical History:   Diagnosis Date    Adjustment disorder with depressed mood     Arthritis     Atrioventricular block     Bipolar disorder     Bradycardia     Cancer     Cataract     OU    Coronary artery disease     Dementia     Depression     HEARING LOSS     High cholesterol     Hypertension     Orthostatic hypotension     Seizures        Past Surgical History:   Procedure Laterality Date    ADENOIDECTOMY      CARDIAC PACEMAKER PLACEMENT      COLONOSCOPY      INSERTION-PACEMAKER-DUAL Left 2015    Performed by [facility] at [facility] CATH LAB    TONSILLECTOMY         Clinical Decision Making:     History  Co-morbidities and personal factors that may impact the plan of care Examination  Body Structures and Functions, activity limitations and participation restrictions that may impact the plan of care Clinical Presentation   Decision Making/ Complexity Score   Co-morbidities:   [] Time since onset of injury / illness / exacerbation  [] Status of current condition  []Patient's cognitive status and safety concerns    [] Multiple Medical Problems (see med hx)  Personal Factors:   [x] Patient's age  [x] Prior Level of function   [] Patient's home situation (environment and family support)  [] Patient's level of motivation  [] Expected progression of  patient      HISTORY:(criteria)    [] 63664 - no personal factors/history    [x] 94468 - has 1-2 personal factor/comorbidity     [] 82895 - has >3 personal factor/comorbidity     Body Regions:  [] Objective examination findings  [] Head     []  Neck  [] Trunk   [] Upper Extremity  [x] Lower Extremity    Body Systems:  [x] For communication ability, affect, cognition, language, and learning style: the assessment of the ability to make needs known, consciousness, orientation (person, place, and time), expected emotional /behavioral responses, and learning preferences (eg, learning barriers, education  needs)  [x] For the neuromuscular system: a general assessment of gross coordinated movement (eg, balance, gait, locomotion, transfers, and transitions) and motor function  (motor control and motor learning)  [x] For the musculoskeletal system: the assessment of gross symmetry, gross range of motion, gross strength, height, and weight  [] For the integumentary system: the assessment of pliability(texture), presence of scar formation, skin color, and skin integrity  [] For cardiovascular/pulmonary system: the assessment of heart rate, respiratory rate, blood pressure, and edema     Activity limitations:    [] Patient's cognitive status and saf ety concerns          [] Status of current condition      [] Weight bearing restriction  [] Cardiopulmunary Restriction    Participation Restrictions:   [] Goals and goal agreement with the patient     [] Rehab potential (prognosis) and probable outcome      Examination of Body System: (criteria)    [] 46561 - addressing 1-2 elements    [x] 36805 - addressing a total of 3 or more elements     [] 60356 -  Addressing a total of 4 or more elements         Clinical Presentation: (criteria)  Stable - 58111     On examination of body system using standardized tests and measures patient presents with 1-2 elements from any of the following: body structures and functions, activity  limitations, and/or participation restrictions.  Leading to a clinical presentation that is considered stable and/or uncomplicated                              Clinical Decision Making  (Eval Complexity):  Low- 27004     Time Tracking:     PT Received On: 09/23/18  PT Start Time: 1028     PT Stop Time: 1045  PT Total Time (min): 17 min     Billable Minutes: Evaluation 15 min      Manuel Gomez, PT  09/23/2018

## 2018-09-23 NOTE — PLAN OF CARE
Problem: Occupational Therapy Goal  Goal: Occupational Therapy Goal  Pt is not currently displaying a need for acute OT services. D/C acute OT services and recommend pt D/C home w/ HH.    D/C acute OT services     Comments: Jorge Dhillon OTR/L  9/23/2018

## 2018-09-23 NOTE — ASSESSMENT & PLAN NOTE
- Previous dose 200 mg BID  - Phenytoin held until level therapeutic on 9/23  - Will restart at 150 mg BID and recheck level prior to fourth dose

## 2018-09-23 NOTE — PT/OT/SLP EVAL
Occupational Therapy   Evaluation    Name: Jesus Beckham  MRN: [id number]  Admitting Diagnosis:  Fall      Recommendations:     Discharge Recommendations:    Discharge Equipment Recommendations:  none  Barriers to discharge:  None    History:     Occupational Profile:  Living Environment: Pt resides in a NH.   Previous level of function: unknown   Roles and Routines: N/A  Equipment Used at Home:  walker, rolling  Assistance upon Discharge: Pt has assistance from NH staff.     Past Medical History:   Diagnosis Date    Adjustment disorder with depressed mood     Arthritis     Atrioventricular block     Bipolar disorder     Bradycardia     Cancer     Cataract     OU    Coronary artery disease     Dementia     Depression     HEARING LOSS     High cholesterol     Hypertension     Orthostatic hypotension     Seizures        Past Surgical History:   Procedure Laterality Date    ADENOIDECTOMY      CARDIAC PACEMAKER PLACEMENT      COLONOSCOPY      INSERTION-PACEMAKER-DUAL Left 12/28/2015    Performed by Ezequiel Carrillo MD at [facility] CATH LAB    TONSILLECTOMY         Subjective     Chief Complaint: no complaint   Patient/Family Comments/goals: return to Home    Pain/Comfort:  · Pain Rating 1: (did not rate)  · Location - Side 1: Bilateral  · Location - Orientation 1: generalized  · Location 1: hip  · Pain Addressed 1: Distraction, Cessation of Activity  · Pain Rating Post-Intervention 1: 0/10    Patients cultural, spiritual, Gnosticist conflicts given the current situation:      Objective:     Communicated with: RN prior to session.  Patient found all lines intact, call button in reach and bed alarm on and   upon OT entry to room.    General Precautions: Standard, fall   Orthopedic Precautions:N/A   Braces: N/A     Occupational Performance:    Bed Mobility:    · Patient completed Supine to Sit with minimum assistance  · Patient completed Sit to Supine with minimum assistance    Functional  Mobility/Transfers:  · Patient completed Sit <> Stand Transfer with minimum assistance  with  rolling walker   · Functional Mobility: Pt ambulated ~130 ft at [facility] w/ RW.     Activities of Daily Living:  · Lower Body Dressing: contact guard assistance donned socks seated EOB    Cognitive/Visual Perceptual:  Cognitive/Psychosocial Skills:     -       Oriented to: Person, Place, Time and Situation   -       Follows Commands/attention:Follows multistep  commands  -       Communication: clear/fluent  -       Memory: No Deficits noted  -       Safety awareness/insight to disability: intact   -       Mood/Affect/Coping skills/emotional control: Appropriate to situation  Visual/Perceptual:      -Intact      Physical Exam:  Balance:    -       Pt dislayed fair to good overall balance   Postural examination/scapula alignment:    -       Rounded shoulders  Skin integrity: Visible skin intact  Upper Extremity Range of Motion:     -       Right Upper Extremity: WFL    -       Left Upper Extremity: WFL      Upper Extremity Strength:    -       Right Upper Extremity: WFL  -       Left Upper Extremity: WFL     Strength:    -       Right Upper Extremity: WFL    -       Left Upper Extremity: WFL    Fine Motor Coordination:    -       Intact  Gross motor coordination:   WFL    AMPAC 6 Click ADL:  AMPAC Total Score: 18    Treatment & Education:  Pt educated on POC.  Education:    Patient left HOB elevated with all lines intact, call button in reach and RN notified    Assessment:     Jesus Beckham is a 89 y.o. male with a medical diagnosis of Fall. Pt is not currently displaying a need for acute OT services. D/C acute OT services and recommend pt D/C back to NH w/ HH.      He presents with the following performance deficits affecting function: weakness, impaired self care skills, impaired functional mobilty, impaired endurance, gait instability, impaired balance, impaired cognition.      Rehab Prognosis: Good; patient would  "benefit from acute skilled OT services to address these deficits and reach maximum level of function.         Clinical Decision Makin.  OT Low:  "Pt evaluation falls under low complexity for evaluation coding due to performance deficits noted in 1-3 areas as stated above and 0 co-morbities affecting current functional status. Data obtained from problem focused assessments. No modifications or assistance was required for completion of evaluation. Only brief occupational profile and history review completed."     Plan:     Patient to be seen (D/C acute OT services ) to address the above listed problems via    · Plan of Care Expires:    · Plan of Care Reviewed with: patient    This Plan of care has been discussed with the patient who was involved in its development and understands and is in agreement with the identified goals and treatment plan    GOALS:   Multidisciplinary Problems     Occupational Therapy Goals     Not on file          Multidisciplinary Problems (Resolved)        Problem: Occupational Therapy Goal    Goal Priority Disciplines Outcome Interventions   Occupational Therapy Goal   (Resolved)     OT, PT/OT Outcome(s) achieved    Description:  Pt is not currently displaying a need for acute OT services. D/C acute OT services and recommend pt D/C home w/ HH.                      Time Tracking:     OT Date of Treatment: 18  OT Start Time: 1027  OT Stop Time: 1042  OT Total Time (min): 15 min    Billable Minutes:Evaluation 15 minutes    Jorge Dhillon OT  2018    "

## 2018-09-23 NOTE — PLAN OF CARE
Problem: Patient Care Overview  Goal: Plan of Care Review  Outcome: Ongoing (interventions implemented as appropriate)  Plan of care reviewed with patient. Patient remains confused at times, disoriented to place, time and situation requiring redirection during contact. Camera/bed alarm remains in use. Three attempts made by patient to ambulate without assistance. Will continue to monitor

## 2018-09-23 NOTE — PLAN OF CARE
Problem: Physical Therapy Goal  Goal: Physical Therapy Goal  Goals to be met by: 10/3/18     Patient will increase functional independence with mobility by performin. Supine to sit with Contact Guard Assistance  2. Sit to supine with Contact Guard Assistance  3. Sit to stand transfer with Stand-by Assistance  4. Bed to chair transfer with Contact Guard Assistance using Rolling Walker  5. Gait  x 200 feet with Contact Guard Assistance using Rolling Walker.     Outcome: Ongoing (interventions implemented as appropriate)  Patient evaluated today. All goals established are appropriate for patient progression at this time.   Manuel Gomez PT, DPT  2018  Pager: [phone]

## 2018-09-23 NOTE — PROGRESS NOTES
"[facility] Hospital Medicine  Progress Note    Patient Name: Jesus Beckham  MRN: [id number]  Patient Class: IP- Inpatient   Admission Date: 2018  Length of Stay: 2 days  Attending Physician: Mala Peoples MD  Primary Care Provider: Tulio Tovar MD    [facility] Medicine Team: [facility] MED 4 González Riggins MD    Subjective:     Principal Problem:Phenytoin poisoning of undetermined intent    HPI:  89M with CAD, dementia, bipolar d/o, seizure d/o, pacemaker (heart block) presents from nursing home with AMS and fall. Patient reportedly fell at nursing home today and unclear if LOC occurred. He states that he "tripped" getting out of bed and he did not hit his head, but he is confused and somnolent. He denies pain. Patient also presented to the ED yesterday for a fall while walking to the dining raymond, and previous note remarks that nursing staff feels he is more lethargic and cannot ambulate as well as normal. Staff denies seizure-like activity, LOC.  In ED, he was noted to have supratherapeutic phenytoin levels. CT-head and c-spine negative, but a hematoma to the R side of the head was seen.    Hospital Course:  Patient with a fall overnight  without injury. Hip X-ray negative. On further discussion, patient states that he is at the end of his life, and is ready to be with his  wife. Discussed the concept of hospice with the patient, and he liked the idea of not returning to the hospital frequently. Will attempt to contact son to discuss this, and will place Palliative consult in the morning. On the morning of , phenytoin level was therapeutic, will restart at 150 mg BID and recheck level prior to fourth dose.    Interval History: NAEON. Awake and alert this morning. Oriented to self and place.     Review of Systems  Objective:     Vital Signs (Most Recent):  Temp: 97.5 °F (36.4 °C) (18 1117)  Pulse: 74 (18 111)  Resp: 16 (18 111)  BP: (!) 143/74 " (09/23/18 1117)  SpO2: 95 % (09/23/18 1117) Vital Signs (24h Range):  Temp:  [97 °F (36.1 °C)-98.3 °F (36.8 °C)] 97.5 °F (36.4 °C)  Pulse:  [73-78] 74  Resp:  [16-18] 16  SpO2:  [93 %-97 %] 95 %  BP: (136-162)/(65-74) 143/74     Weight: 72.6 kg (160 lb)  Body mass index is 22.32 kg/m².    Intake/Output Summary (Last 24 hours) at 9/23/2018 1338  Last data filed at 9/22/2018 1847  Gross per 24 hour   Intake 400 ml   Output --   Net 400 ml      Physical Exam   Constitutional: No distress.   Pleasantly demented, elderly gentleman.   HENT:   Head: Normocephalic.   Facial abrasion healing well   Eyes: EOM are normal. No scleral icterus.   Neck: Normal range of motion.   Cardiovascular: Normal rate, regular rhythm, normal heart sounds and intact distal pulses.   Pulmonary/Chest: Effort normal and breath sounds normal.   Abdominal: Soft. He exhibits no distension. There is no tenderness.   Musculoskeletal: Normal range of motion. He exhibits no edema.   Neurological: He is alert.   Skin: Skin is warm and dry. Capillary refill takes less than 2 seconds.   Psychiatric: He has a normal mood and affect. His behavior is normal.       Significant Labs: All pertinent labs within the past 24 hours have been reviewed.    Significant Imaging: I have reviewed and interpreted all pertinent imaging results/findings within the past 24 hours.    Assessment/Plan:      * Phenytoin poisoning of undetermined intent    - Previous dose 200 mg BID  - Phenytoin held until level therapeutic on 9/23  - Will restart at 150 mg BID and recheck level prior to fourth dose            Discharge planning issues    - Return to NH vs hospice per discussion with son          Altered mental status    Patient more lethargic than baseline per nursing home staff. Differential includes progression of dementia, trauma, medication side effect, toxicity, CVA. No metabolic or new structural abnormalities noted.  - Improving, now alert; however, severe dementia at  baseline        Fall    89M with dementia, seizure d/o, and pacemaker presents with recurrent falls in nursing home. No acute head or musculoskeletal trauma noted on physical exam. Negative head and c-spine CTs, negative hip X-ray. No seizure-like activity noted. High phenytoin levels seen. Presentation most consistent with arrythmias, orthostasis, progression of dementia and debility, possibly secondary to phenytoin toxicity.  - Patient continues to try to get out of bed on his own  - Continue holding phenytoin due to levels remaining high  - Holding seroquel and zoloft due to lethargy, will resume as appropriate  - PT/OT        Complete heart block    S/p pacemaker. EKG reveals paced rhythm        Dementia    Patient disoriented, with severely impaired memory.  - Continue donepezil        Seizure disorder    Phenytoin dosing per primary problem  - Continue home Keppra  - No seizures during admission thus far          VTE Risk Mitigation (From admission, onward)        Ordered     Place ELIJAH hose  Until discontinued      09/21/18 1148     IP VTE HIGH RISK PATIENT  Once      09/21/18 1148     Place sequential compression device  Until discontinued      09/21/18 1108              González Riggins MD  Department of Hospital Medicine   [facility]

## 2018-09-23 NOTE — ASSESSMENT & PLAN NOTE
89M with dementia, seizure d/o, and pacemaker presents with recurrent falls in nursing home. No acute head or musculoskeletal trauma noted on physical exam. Negative head and c-spine CTs, negative hip X-ray. No seizure-like activity noted. High phenytoin levels seen. Presentation most consistent with arrythmias, orthostasis, progression of dementia and debility, possibly secondary to phenytoin toxicity.  - Patient continues to try to get out of bed on his own  - Continue holding phenytoin due to levels remaining high  - Holding seroquel and zoloft due to lethargy, will resume as appropriate  - PT/OT

## 2018-09-23 NOTE — PLAN OF CARE
Problem: Physical Therapy Goal  Goal: Physical Therapy Goal  Outcome: Outcome(s) achieved Date Met: 09/23/18  Patient evaluated today. All goals established are appropriate for patient progression at this time.      Manuel Gomez PT, DPT  9/23/2018  Pager: [phone]

## 2018-09-23 NOTE — ASSESSMENT & PLAN NOTE
Patient more lethargic than baseline per nursing home staff. Differential includes progression of dementia, trauma, medication side effect, toxicity, CVA. No metabolic or new structural abnormalities noted.  - Improving, now alert; however, severe dementia at baseline

## 2018-09-24 LAB
ALBUMIN SERPL BCP-MCNC: 3.1 G/DL
ALP SERPL-CCNC: 104 U/L
ALT SERPL W/O P-5'-P-CCNC: 14 U/L
ANION GAP SERPL CALC-SCNC: 12 MMOL/L
AST SERPL-CCNC: 23 U/L
BASOPHILS # BLD AUTO: 0.04 K/UL
BASOPHILS NFR BLD: 0.5 %
BILIRUB SERPL-MCNC: 0.5 MG/DL
BUN SERPL-MCNC: 16 MG/DL
CALCIUM SERPL-MCNC: 8.2 MG/DL
CHLORIDE SERPL-SCNC: 107 MMOL/L
CO2 SERPL-SCNC: 22 MMOL/L
CREAT SERPL-MCNC: 0.8 MG/DL
DIFFERENTIAL METHOD: ABNORMAL
EOSINOPHIL # BLD AUTO: 0.4 K/UL
EOSINOPHIL NFR BLD: 5.2 %
ERYTHROCYTE [DISTWIDTH] IN BLOOD BY AUTOMATED COUNT: 13.2 %
EST. GFR  (AFRICAN AMERICAN): >60 ML/MIN/1.73 M^2
EST. GFR  (NON AFRICAN AMERICAN): >60 ML/MIN/1.73 M^2
GLUCOSE SERPL-MCNC: 76 MG/DL
HCT VFR BLD AUTO: 41 %
HGB BLD-MCNC: 13.7 G/DL
IMM GRANULOCYTES # BLD AUTO: 0.02 K/UL
IMM GRANULOCYTES NFR BLD AUTO: 0.3 %
LYMPHOCYTES # BLD AUTO: 2 K/UL
LYMPHOCYTES NFR BLD: 26 %
MCH RBC QN AUTO: 32.5 PG
MCHC RBC AUTO-ENTMCNC: 33.4 G/DL
MCV RBC AUTO: 97 FL
MONOCYTES # BLD AUTO: 0.9 K/UL
MONOCYTES NFR BLD: 12.1 %
NEUTROPHILS # BLD AUTO: 4.3 K/UL
NEUTROPHILS NFR BLD: 55.9 %
NRBC BLD-RTO: 0 /100 WBC
PHENYTOIN SERPL-MCNC: 14.2 UG/ML
PLATELET # BLD AUTO: 177 K/UL
PMV BLD AUTO: 10.5 FL
POTASSIUM SERPL-SCNC: 3.7 MMOL/L
PROT SERPL-MCNC: 6.7 G/DL
RBC # BLD AUTO: 4.22 M/UL
SODIUM SERPL-SCNC: 141 MMOL/L
WBC # BLD AUTO: 7.62 K/UL

## 2018-09-24 PROCEDURE — 99232 SBSQ HOSP IP/OBS MODERATE 35: CPT | Mod: ,,, | Performed by: HOSPITALIST

## 2018-09-24 PROCEDURE — 11000001 HC ACUTE MED/SURG PRIVATE ROOM

## 2018-09-24 PROCEDURE — 25000003 PHARM REV CODE 250: Performed by: STUDENT IN AN ORGANIZED HEALTH CARE EDUCATION/TRAINING PROGRAM

## 2018-09-24 PROCEDURE — G9168 MEMORY CURRENT STATUS: HCPCS | Mod: CL

## 2018-09-24 PROCEDURE — 36415 COLL VENOUS BLD VENIPUNCTURE: CPT

## 2018-09-24 PROCEDURE — 92523 SPEECH SOUND LANG COMPREHEN: CPT

## 2018-09-24 PROCEDURE — 80185 ASSAY OF PHENYTOIN TOTAL: CPT

## 2018-09-24 PROCEDURE — 80053 COMPREHEN METABOLIC PANEL: CPT

## 2018-09-24 PROCEDURE — 25000003 PHARM REV CODE 250

## 2018-09-24 PROCEDURE — 85025 COMPLETE CBC W/AUTO DIFF WBC: CPT

## 2018-09-24 PROCEDURE — G9169 MEMORY GOAL STATUS: HCPCS | Mod: CL

## 2018-09-24 RX ADMIN — LEVETIRACETAM 500 MG: 500 TABLET ORAL at 09:09

## 2018-09-24 RX ADMIN — ACETAMINOPHEN 650 MG: 325 TABLET ORAL at 03:09

## 2018-09-24 RX ADMIN — EXTENDED PHENYTOIN SODIUM 150 MG: 30 CAPSULE ORAL at 09:09

## 2018-09-24 RX ADMIN — EXTENDED PHENYTOIN SODIUM 150 MG: 30 CAPSULE ORAL at 10:09

## 2018-09-24 RX ADMIN — ACETAMINOPHEN 650 MG: 325 TABLET ORAL at 09:09

## 2018-09-24 RX ADMIN — OXYCODONE HYDROCHLORIDE AND ACETAMINOPHEN 500 MG: 500 TABLET ORAL at 09:09

## 2018-09-24 RX ADMIN — LEVETIRACETAM 500 MG: 500 TABLET ORAL at 10:09

## 2018-09-24 RX ADMIN — TAMSULOSIN HYDROCHLORIDE 0.4 MG: 0.4 CAPSULE ORAL at 10:09

## 2018-09-24 RX ADMIN — DONEPEZIL HYDROCHLORIDE 5 MG: 5 TABLET, FILM COATED ORAL at 09:09

## 2018-09-24 NOTE — PLAN OF CARE
12:18 PM  CHUCK spoke w/Georgette Chaparro today at [facility], requested update on Pt.  CHUCK faxed via  updated info packet on Pt per request.      CHUCK following for DC needs.  CHUCK in communication with GLEN.        Nisa Nunez, MYKE  [facility]

## 2018-09-24 NOTE — PT/OT/SLP EVAL
Speech Language Pathology Evaluation  Cognitive Communication    Patient Name:  Jesus Beckham   MRN:  [id number]  Admitting Diagnosis: Phenytoin poisoning of undetermined intent    Recommendations:     Recommendations:                General Recommendations:  Cognitive-linguistic therapy  Aspiration Precautions: Standard aspiration precautions   General Precautions: Standard, fall  Communication strategies:  provide increased time to answer and go to room if call light pushed    History:     Past Medical History:   Diagnosis Date    Adjustment disorder with depressed mood     Arthritis     Atrioventricular block     Bipolar disorder     Bradycardia     Cancer     Cataract     OU    Coronary artery disease     Dementia     Depression     HEARING LOSS     High cholesterol     Hypertension     Orthostatic hypotension     Seizures        Past Surgical History:   Procedure Laterality Date    ADENOIDECTOMY      CARDIAC PACEMAKER PLACEMENT      COLONOSCOPY      INSERTION-PACEMAKER-DUAL Left 12/28/2015    Performed by Ezequiel Carrillo MD at [facility]    TONSILLECTOMY       Prior diet: regular/thin.    Subjective     Patient alert and cooperative during eval  SLP communicated with nurse prior to eval     Pain/Comfort:  · Pain Rating 1: 0/10  · Pain Rating Post-Intervention 1: 0/10    Objective:   Cognitive Status:    Attention No obvious deficits observed during eval  Orientation Person and Place  Memory Immediate Recall WFL, Delayed Recall 0/3 unrelated object independently, 2/3 with mod assist and 5 minute interval and recall general information max assist for address  Problem Solving Categories min assist for simple categorical question and Compare/contrast mod assist for simple comparison question    Receptive Language:   Comprehension:      WFL    Pragmatics:    inconsistent eye contact throughout session    Expressive Language:  Verbal:    Automatic Speech  Days of the week WFL  Repetition  Sentences WFL  Naming Confrontation WFL and Divergent responsive 8 related items (animals) in one minute      Motor Speech:  WFL    Voice:   WFL    Visual-Spatial:  to be assessed    Reading:   to be assessed     Written Expression:   to be assessed    Treatment: Patient educated regarding the role of ST in language and cognition. Patient does not report any difficulties with language or cognition currently, but does have baseline dementia per chart.     Assessment:   Jesus Beckham is a 89 y.o. male with an SLP diagnosis of Cognitive-Linguistic Impairment.      Goals:   Multidisciplinary Problems     SLP Goals        Problem: SLP Goal    Goal Priority Disciplines Outcome   SLP Goal     SLP    Description:  Speech-Language Pathology Goals  Goals to be met by 10/1/18  1. Patient will orient x4 with max assist.  2. Patient will recall 2/3 unrelated items with mod assist and 5-minute interval.   3. Patient will complete simple problem solving tasks with 75% accuracy and mod assist.  4. Patient will participate in further evaluation to assess writing/reading/visiospatial.                     Plan:   · Patient to be seen:  2 x/week   · Plan of Care expires:  10/24/18  · Plan of Care reviewed with:  patient   · SLP Follow-Up:  Yes       Discharge recommendations:  Discharge Facility/Level Of Care Needs: (nursing home)   Barriers to Discharge:  Level of Skilled Assistance Needed     Time Tracking:   SLP Treatment Date:   09/24/18  Speech Start Time:  1321  Speech Stop Time:  1335     Speech Total Time (min):  14 min    Billable Minutes: ARTURO Malone-SLP  Speech-Language Pathology  Pager: [phone]   09/24/2018

## 2018-09-24 NOTE — ASSESSMENT & PLAN NOTE
- Previous dose 200 mg BID  - Phenytoin held until level therapeutic on 9/23  - Started 150 mg BID  - Plan to recheck phenytoin levels this evening

## 2018-09-24 NOTE — PROGRESS NOTES
"[facility] Hospital Medicine  Progress Note    Patient Name: Jesus Beckham  MRN: [id number]  Patient Class: IP- Inpatient   Admission Date: 2018  Length of Stay: 3 days  Attending Physician: Mala Peoples MD  Primary Care Provider: Tulio Tovar MD    [facility] Medicine Team: [facility] 4 Marcell Carpenter MD    Subjective:     Principal Problem:Phenytoin poisoning of undetermined intent    HPI:  89M with CAD, dementia, bipolar d/o, seizure d/o, pacemaker (heart block) presents from nursing home with AMS and fall. Patient reportedly fell at nursing home today and unclear if LOC occurred. He states that he "tripped" getting out of bed and he did not hit his head, but he is confused and somnolent. He denies pain. Patient also presented to the ED yesterday for a fall while walking to the dining raymond, and previous note remarks that nursing staff feels he is more lethargic and cannot ambulate as well as normal. Staff denies seizure-like activity, LOC.  In ED, he was noted to have supratherapeutic phenytoin levels. CT-head and c-spine negative, but a hematoma to the R side of the head was seen.    Hospital Course:  Patient with a fall overnight  without injury. Hip X-ray negative. On further discussion, patient states that he is at the end of his life, and is ready to be with his  wife. Discussed the concept of hospice with the patient, and he liked the idea of not returning to the hospital frequently. Will attempt to contact son to discuss this, and will place Palliative consult in the morning. On the morning of , phenytoin level was therapeutic. Pt started on 150 mg BID (). Plan to recheck phenytoin levels this evening .     Interval History: NAEON. Awake and alert this morning. Somnolent but easily arousable.     Review of Systems    Objective:     Vital Signs (Most Recent):  Temp: 98 °F (36.7 °C) (18 1153)  Pulse: 68 (18 1153)  Resp: 20 (18 " 1024)  BP: (!) 159/71 (09/24/18 1153)  SpO2: (!) 94 % (09/24/18 1153) Vital Signs (24h Range):  Temp:  [96.7 °F (35.9 °C)-98.7 °F (37.1 °C)] 98 °F (36.7 °C)  Pulse:  [63-76] 68  Resp:  [17-20] 20  SpO2:  [93 %-97 %] 94 %  BP: (134-159)/(64-74) 159/71     Weight: 72.6 kg (160 lb)  Body mass index is 22.32 kg/m².    Intake/Output Summary (Last 24 hours) at 9/24/2018 1300  Last data filed at 9/24/2018 0800  Gross per 24 hour   Intake 1685 ml   Output 950 ml   Net 735 ml      Physical Exam   Constitutional: No distress.   Pleasantly demented, elderly gentleman.   HENT:   Head: Normocephalic.   Facial abrasion healing well   Eyes: EOM are normal. No scleral icterus.   Neck: Normal range of motion.   Cardiovascular: Normal rate, regular rhythm, normal heart sounds and intact distal pulses.   Pulmonary/Chest: Effort normal and breath sounds normal.   Abdominal: Soft. He exhibits no distension. There is no tenderness.   Musculoskeletal: Normal range of motion. He exhibits no edema.   Neurological: He is alert.   Skin: Skin is warm and dry. Capillary refill takes less than 2 seconds.   Psychiatric: He has a normal mood and affect. His behavior is normal.       Significant Labs: All pertinent labs within the past 24 hours have been reviewed.    Significant Imaging: I have reviewed and interpreted all pertinent imaging results/findings within the past 24 hours.    Assessment/Plan:      * Phenytoin poisoning of undetermined intent    - Previous dose 200 mg BID  - Phenytoin held until level therapeutic on 9/23  - Started 150 mg BID  - Plan to recheck phenytoin levels this evening             Discharge planning issues    - Return to NH vs hospice per discussion with son  - Left message w/ son today regarding goals of care. Will follow-up tomorrow.           Altered mental status    Patient more lethargic than baseline per nursing home staff. Differential includes progression of dementia, trauma, medication side effect, toxicity,  CVA. No metabolic or new structural abnormalities noted.  - Pt alert today, however, severe dementia at baseline        Fall    89M with dementia, seizure d/o, and pacemaker presents with recurrent falls in nursing home. No acute head or musculoskeletal trauma noted on physical exam. Negative head and c-spine CTs, negative hip X-ray. No seizure-like activity noted. High phenytoin levels seen. Presentation most consistent with arrythmias, orthostasis, progression of dementia and debility, possibly secondary to phenytoin toxicity.  - Patient continues to try to get out of bed on his own  - Holding seroquel and zoloft due to lethargy, will resume as appropriate  - PT/OT        Complete heart block    S/p pacemaker. EKG reveals paced rhythm        Dementia    Patient disoriented, with severely impaired memory.  - Continue donepezil        Seizure disorder    Phenytoin dosing per primary problem  - Continue home Keppra  - No seizures during admission thus far          VTE Risk Mitigation (From admission, onward)        Ordered     Place ELIJAH hose  Until discontinued      09/21/18 1148     IP VTE HIGH RISK PATIENT  Once      09/21/18 1148     Place sequential compression device  Until discontinued      09/21/18 1108              Marcell Carpenter MD  Department of Hospital Medicine   [facility]

## 2018-09-24 NOTE — PLAN OF CARE
Tulio Tovar MD     Payor: MEDICARE / Plan: MEDICARE PART A & B / Product Type: Government /      Extended Emergency Contact Information  Primary Emergency Contact: Juan Beckham  Address: unknown   [facility]  Home Phone: [phone]  Mobile Phone: [phone]  Relation: Carl  Preferred language: English        09/24/18 1042   Discharge Assessment   Assessment Type Discharge Planning Assessment   Confirmed/corrected address and phone number on facesheet? Yes   Assessment information obtained from? Patient   Expected Length of Stay (days) 3   Communicated expected length of stay with patient/caregiver yes   Prior to hospitilization cognitive status: Alert/Oriented   Prior to hospitalization functional status: Assistive Equipment   Current cognitive status: Alert/Oriented   Current Functional Status: Assistive Equipment;Needs Assistance   Lives With facility resident   Able to Return to Prior Arrangements yes   Is patient able to care for self after discharge? Unable to determine at this time (comments)   Patient's perception of discharge disposition nursing home   Readmission Within The Last 30 Days no previous admission in last 30 days   Patient currently being followed by outpatient case management? No   Patient currently receives any other outside agency services? No   Equipment Currently Used at Home walker, rolling   Do you have any problems affording any of your prescribed medications? No   Is the patient taking medications as prescribed? yes   Does the patient have transportation home? Yes   Transportation Available agency transportation   Does the patient receive services at the [facility]? No   Discharge Plan A Return to nursing home   Discharge Plan B New Nursing [facility] placement [facility]   Patient/Family In Agreement With Plan yes

## 2018-09-24 NOTE — PLAN OF CARE
Problem: Fall Risk (Adult)  Goal: Identify Related Risk Factors and Signs and Symptoms  Related risk factors and signs and symptoms are identified upon initiation of Human Response Clinical Practice Guideline (CPG)  Outcome: Ongoing (interventions implemented as appropriate)   09/24/18 1612   Fall Risk   Related Risk Factors (Fall Risk) age-related changes;bladder function altered;confusion/agitation;gait/mobility problems;fear of falling;polypharmacy       Problem: Patient Care Overview  Goal: Plan of Care Review  Outcome: Ongoing (interventions implemented as appropriate)  POC reviewed with patient and patient's son; no evidence of learning regarding patient. Patient compliant with medication regimen. Son was able to talk to someone from Med Team 4. VSS. Safety precautions in place; call light within reach, bed in low position, wheels locked, side rails up x2. Will continue to monitor.    Problem: Confusion, Acute (Adult)  Goal: Safety  Patient will demonstrate the desired outcomes by discharge/transition of care.  Outcome: Ongoing (interventions implemented as appropriate)   09/24/18 1612   Confusion, Acute (Adult)   Safety making progress toward outcome

## 2018-09-24 NOTE — ASSESSMENT & PLAN NOTE
89M with dementia, seizure d/o, and pacemaker presents with recurrent falls in nursing home. No acute head or musculoskeletal trauma noted on physical exam. Negative head and c-spine CTs, negative hip X-ray. No seizure-like activity noted. High phenytoin levels seen. Presentation most consistent with arrythmias, orthostasis, progression of dementia and debility, possibly secondary to phenytoin toxicity.  - Patient continues to try to get out of bed on his own  - Holding seroquel and zoloft due to lethargy, will resume as appropriate  - PT/OT

## 2018-09-24 NOTE — PLAN OF CARE
Problem: SLP Goal  Goal: SLP Goal  Speech-Language Pathology Goals  Goals to be met by 10/1/18  1. Patient will orient x4 with max assist.  2. Patient will recall 2/3 unrelated items with mod assist and 5-minute interval.   3. Patient will complete simple problem solving tasks with 75% accuracy and mod assist.  4. Patient will participate in further evaluation to assess writing/reading/visiospatial.   Patient seen for speech/language/cognitive evaluation.     Abdelrahman Calderon CF-SLP  Speech-Language Pathology  Pager: [phone]

## 2018-09-24 NOTE — ASSESSMENT & PLAN NOTE
- Return to NH vs hospice per discussion with son  - Left message w/ son today regarding goals of care. Will follow-up tomorrow.

## 2018-09-24 NOTE — ASSESSMENT & PLAN NOTE
Patient more lethargic than baseline per nursing home staff. Differential includes progression of dementia, trauma, medication side effect, toxicity, CVA. No metabolic or new structural abnormalities noted.  - Pt alert today, however, severe dementia at baseline

## 2018-09-24 NOTE — SUBJECTIVE & OBJECTIVE
Interval History: NAEON. Awake and alert this morning. Somnolent but easily arousable.     Review of Systems    Objective:     Vital Signs (Most Recent):  Temp: 98 °F (36.7 °C) (09/24/18 1153)  Pulse: 68 (09/24/18 1153)  Resp: 20 (09/24/18 1024)  BP: (!) 159/71 (09/24/18 1153)  SpO2: (!) 94 % (09/24/18 1153) Vital Signs (24h Range):  Temp:  [96.7 °F (35.9 °C)-98.7 °F (37.1 °C)] 98 °F (36.7 °C)  Pulse:  [63-76] 68  Resp:  [17-20] 20  SpO2:  [93 %-97 %] 94 %  BP: (134-159)/(64-74) 159/71     Weight: 72.6 kg (160 lb)  Body mass index is 22.32 kg/m².    Intake/Output Summary (Last 24 hours) at 9/24/2018 1300  Last data filed at 9/24/2018 0800  Gross per 24 hour   Intake 1685 ml   Output 950 ml   Net 735 ml      Physical Exam   Constitutional: No distress.   Pleasantly demented, elderly gentleman.   HENT:   Head: Normocephalic.   Facial abrasion healing well   Eyes: EOM are normal. No scleral icterus.   Neck: Normal range of motion.   Cardiovascular: Normal rate, regular rhythm, normal heart sounds and intact distal pulses.   Pulmonary/Chest: Effort normal and breath sounds normal.   Abdominal: Soft. He exhibits no distension. There is no tenderness.   Musculoskeletal: Normal range of motion. He exhibits no edema.   Neurological: He is alert.   Skin: Skin is warm and dry. Capillary refill takes less than 2 seconds.   Psychiatric: He has a normal mood and affect. His behavior is normal.       Significant Labs: All pertinent labs within the past 24 hours have been reviewed.    Significant Imaging: I have reviewed and interpreted all pertinent imaging results/findings within the past 24 hours.

## 2018-09-25 VITALS
HEIGHT: 71 IN | WEIGHT: 160 LBS | OXYGEN SATURATION: 96 % | HEART RATE: 70 BPM | BODY MASS INDEX: 22.4 KG/M2 | TEMPERATURE: 97 F | DIASTOLIC BLOOD PRESSURE: 83 MMHG | RESPIRATION RATE: 16 BRPM | SYSTOLIC BLOOD PRESSURE: 140 MMHG

## 2018-09-25 PROBLEM — Z51.5 PALLIATIVE CARE ENCOUNTER: Status: ACTIVE | Noted: 2018-09-25

## 2018-09-25 PROBLEM — R41.82 ALTERED MENTAL STATUS: Status: RESOLVED | Noted: 2018-09-21 | Resolved: 2018-09-25

## 2018-09-25 PROBLEM — Z51.5 PALLIATIVE CARE ENCOUNTER: Status: RESOLVED | Noted: 2018-09-25 | Resolved: 2018-09-25

## 2018-09-25 LAB
ALBUMIN SERPL BCP-MCNC: 3 G/DL
ALP SERPL-CCNC: 100 U/L
ALT SERPL W/O P-5'-P-CCNC: 12 U/L
ANION GAP SERPL CALC-SCNC: 9 MMOL/L
AST SERPL-CCNC: 20 U/L
BASOPHILS # BLD AUTO: 0.03 K/UL
BASOPHILS NFR BLD: 0.4 %
BILIRUB SERPL-MCNC: 0.5 MG/DL
BUN SERPL-MCNC: 13 MG/DL
CALCIUM SERPL-MCNC: 8.3 MG/DL
CHLORIDE SERPL-SCNC: 106 MMOL/L
CO2 SERPL-SCNC: 23 MMOL/L
CREAT SERPL-MCNC: 0.7 MG/DL
DIFFERENTIAL METHOD: ABNORMAL
EOSINOPHIL # BLD AUTO: 0.4 K/UL
EOSINOPHIL NFR BLD: 6.1 %
ERYTHROCYTE [DISTWIDTH] IN BLOOD BY AUTOMATED COUNT: 12.9 %
EST. GFR  (AFRICAN AMERICAN): >60 ML/MIN/1.73 M^2
EST. GFR  (NON AFRICAN AMERICAN): >60 ML/MIN/1.73 M^2
GLUCOSE SERPL-MCNC: 83 MG/DL
HCT VFR BLD AUTO: 38.5 %
HGB BLD-MCNC: 12.9 G/DL
IMM GRANULOCYTES # BLD AUTO: 0.01 K/UL
IMM GRANULOCYTES NFR BLD AUTO: 0.1 %
LYMPHOCYTES # BLD AUTO: 2.3 K/UL
LYMPHOCYTES NFR BLD: 32.5 %
MCH RBC QN AUTO: 32 PG
MCHC RBC AUTO-ENTMCNC: 33.5 G/DL
MCV RBC AUTO: 96 FL
MONOCYTES # BLD AUTO: 0.9 K/UL
MONOCYTES NFR BLD: 13.4 %
NEUTROPHILS # BLD AUTO: 3.3 K/UL
NEUTROPHILS NFR BLD: 47.5 %
NRBC BLD-RTO: 0 /100 WBC
PHENYTOIN SERPL-MCNC: 14.8 UG/ML
PHENYTOIN SERPL-MCNC: 15.1 UG/ML
PLATELET # BLD AUTO: 187 K/UL
PMV BLD AUTO: 10.4 FL
POTASSIUM SERPL-SCNC: 3.6 MMOL/L
PROT SERPL-MCNC: 6.6 G/DL
RBC # BLD AUTO: 4.03 M/UL
SODIUM SERPL-SCNC: 138 MMOL/L
WBC # BLD AUTO: 7.01 K/UL

## 2018-09-25 PROCEDURE — 99233 SBSQ HOSP IP/OBS HIGH 50: CPT | Mod: ,,, | Performed by: HOSPITALIST

## 2018-09-25 PROCEDURE — 25000003 PHARM REV CODE 250: Performed by: STUDENT IN AN ORGANIZED HEALTH CARE EDUCATION/TRAINING PROGRAM

## 2018-09-25 PROCEDURE — 99223 1ST HOSP IP/OBS HIGH 75: CPT | Mod: ,,, | Performed by: CLINICAL NURSE SPECIALIST

## 2018-09-25 PROCEDURE — 25000003 PHARM REV CODE 250

## 2018-09-25 PROCEDURE — 80185 ASSAY OF PHENYTOIN TOTAL: CPT

## 2018-09-25 PROCEDURE — 80053 COMPREHEN METABOLIC PANEL: CPT

## 2018-09-25 PROCEDURE — 85025 COMPLETE CBC W/AUTO DIFF WBC: CPT

## 2018-09-25 PROCEDURE — 36415 COLL VENOUS BLD VENIPUNCTURE: CPT

## 2018-09-25 RX ORDER — POTASSIUM CHLORIDE 20 MEQ/1
40 TABLET, EXTENDED RELEASE ORAL ONCE
Status: COMPLETED | OUTPATIENT
Start: 2018-09-25 | End: 2018-09-25

## 2018-09-25 RX ADMIN — TAMSULOSIN HYDROCHLORIDE 0.4 MG: 0.4 CAPSULE ORAL at 09:09

## 2018-09-25 RX ADMIN — EXTENDED PHENYTOIN SODIUM 150 MG: 30 CAPSULE ORAL at 09:09

## 2018-09-25 RX ADMIN — LEVETIRACETAM 500 MG: 500 TABLET ORAL at 09:09

## 2018-09-25 RX ADMIN — POTASSIUM CHLORIDE 40 MEQ: 1500 TABLET, EXTENDED RELEASE ORAL at 12:09

## 2018-09-25 NOTE — DISCHARGE SUMMARY
"[facility] Hospital Medicine  Discharge Summary      Patient Name: Jesus Beckham  MRN: [id number]  Admission Date: 2018  Hospital Length of Stay: 4 days  Discharge Date and Time:  2018 12:53 PM  Attending Physician: Rachid Barcenas MD   Discharging Provider: Marcell Carpenter MD  Primary Care Provider: Tulio Tovar MD    Hospital Medicine Team: [facility] 4 Marcell Carpenter MD    HPI:   89M with CAD, dementia, bipolar d/o, seizure d/o, pacemaker (heart block) presents from nursing home with AMS and fall. Patient reportedly fell at nursing home today and unclear if LOC occurred. He states that he "tripped" getting out of bed and he did not hit his head, but he is confused and somnolent. He denies pain. Patient also presented to the ED yesterday for a fall while walking to the dining [place]mond, and previous note remarks that nursing staff feels he is more lethargic and cannot ambulate as well as normal. Staff denies seizure-like activity, LOC.  In ED, he was noted to have supratherapeutic phenytoin levels. CT-head and c-spine negative, but a hematoma to the R side of the head was seen.     * No surgery found *      Hospital Course:   Patient with a fall overnight  without injury. Hip X-ray negative. On further discussion, patient states that he is at the end of his life, and is ready to be with his  wife. Discussed the concept of hospice with the patient, and he liked the idea of not returning to the hospital frequently. On the morning of , phenytoin level was therapeutic. Pt started on 150 mg BID (). Repeat phenytoin level remains therapeutic at 14.8 on . Plan to discharge to [facility].     Physical Exam   Constitutional: He is oriented to person, place, and time. He appears well-developed and well-nourished. No distress.   HENT:   Head: Normocephalic and atraumatic.   Mouth/Throat: Oropharynx is clear and moist.   Eyes: Conjunctivae and EOM are normal. Pupils " are equal, round, and reactive to light. Left eye exhibits no discharge. No scleral icterus.   Neck: Normal range of motion. Neck supple. No JVD present.   Cardiovascular: Normal rate, regular rhythm, normal heart sounds and intact distal pulses.   Pulmonary/Chest: Effort normal and breath sounds normal. He has no wheezes.   Abdominal: Soft. Bowel sounds are normal.   Neurological: He is alert and oriented to person, place, and time. No cranial nerve deficit.   Skin: Skin is warm.   Psychiatric: He has a normal mood and affect.   Vitals reviewed.      Consults:   Consults (From admission, onward)        Status Ordering Provider     Inpatient consult to Palliative Care  Once     Provider:  (Not yet assigned)    Completed EVELIA HYMAN          Final Active Diagnoses:    Diagnosis Date Noted POA    PRINCIPAL PROBLEM:  Phenytoin poisoning of undetermined intent [T42.0X4A] 09/21/2018 Yes    Palliative care encounter [Z51.5] 09/25/2018 Not Applicable    Discharge planning issues [Z02.9] 09/23/2018 Not Applicable    Altered mental status [R41.82] 09/21/2018 Unknown    Fall [W19.XXXA]  Yes    Complete heart block [I44.2] 12/26/2015 Yes    Seizure disorder [G40.909] 11/27/2012 Yes    Dementia [F03.90] 11/27/2012 Yes    CAD (coronary artery disease) [I25.10] 03/13/2012 Yes     Chronic      Problems Resolved During this Admission:      Discharged Condition: stable    Disposition: Home or Self Care    Follow Up:    Patient Instructions:      Diet Adult Regular     Notify your health care provider if you experience any of the following:  temperature >100.4     Notify your health care provider if you experience any of the following:  redness, tenderness, or signs of infection (pain, swelling, redness, odor or green/yellow discharge around incision site)     Notify your health care provider if you experience any of the following:  severe uncontrolled pain     Notify your health care provider if you experience any of the  following:  persistent dizziness, light-headedness, or visual disturbances     Activity as tolerated     Medications:  Reconciled Home Medications:      Medication List      CHANGE how you take these medications    levETIRAcetam 500 MG Tab  Commonly known as:  KEPPRA  Take 1 tablet (500 mg total) by mouth 2 (two) times daily. 1 Tablet Oral Twice a day  What changed:  additional instructions     phenytoin 30 MG ER capsule  Commonly known as:  DILANTIN  Take 5 capsules (150 mg total) by mouth 2 (two) times daily. Dose decreased on 09/23/2018.  Please draw phenytoin level 30 minutes PRIOR to the AM dose on 10/01/2018 and adjust dose accordingly. Goal total phenytoin level adjusted for albumin 10-20 mg/L.  What changed:    · medication strength  · how much to take  · additional instructions        CONTINUE taking these medications    acetaminophen 500 MG tablet  Commonly known as:  TYLENOL  Take 500 mg by mouth every 6 (six) hours as needed for Pain.     ascorbic acid (vitamin C) 500 MG tablet  Commonly known as:  VITAMIN C  Take 500 mg by mouth every evening.     atorvastatin 40 MG tablet  Commonly known as:  LIPITOR  Take 40 mg by mouth every evening.     CALCIUM 600 ORAL  Take 2 tablets by mouth once daily.     carvedilol 3.125 MG tablet  Commonly known as:  COREG  Take 3.125 mg by mouth 2 (two) times daily.     chlorhexidine 0.12 % solution  Commonly known as:  PERIDEX  Swish and spit every evening     donepezil 5 MG tablet  Commonly known as:  ARICEPT  Take 5 mg by mouth every evening.     DULCOLAX (BISACODYL) 10 mg Supp  Generic drug:  bisacodyl  Place 10 mg rectally daily as needed (constipation).     lactulose 10 gram/15 mL solution  Commonly known as:  CHRONULAC  Take 10 g by mouth once daily.     LINZESS 72 mcg Cap  Generic drug:  linaclotide  Take 1 tablet by mouth once daily     LOTRISONE cream  Generic drug:  clotrimazole-betamethasone 1-0.05%  Apply to facial rash twice daily     multivitamin per  tablet  Commonly known as:  THERAGRAN  Take 1 tablet by mouth once daily.     potassium chloride SA 20 MEQ tablet  Commonly known as:  K-DUR,KLOR-CON  Take 20 mEq by mouth every evening.     tamsulosin 0.4 mg Cap  Commonly known as:  FLOMAX  Take 1 capsule (0.4 mg total) by mouth once daily.            Significant Diagnostic Studies: Labs: All labs within the past 24 hours have been reviewed    Pending Diagnostic Studies:     None        Indwelling Lines/Drains at time of discharge:   Lines/Drains/Airways     Pressure Ulcer                 Pressure Injury 09/20/18 2030 other (see comments) Stage 1 4 days                Time spent on the discharge of patient: 30 minutes  Patient was seen and examined on the date of discharge and determined to be suitable for discharge.         Marcell Carpenter MD  Department of Hospital Medicine  [facility]

## 2018-09-25 NOTE — PROGRESS NOTES
"[facility] Hospital Medicine  Progress Note    Patient Name: Jesus Beckham  MRN: [id number]  Patient Class: IP- Inpatient   Admission Date: 2018  Length of Stay: 4 days  Attending Physician: Rachid Barcenas MD  Primary Care Provider: Tulio Tovar MD    [facility] Medicine Team: [facility] 4 Marcell Carpenter MD    Subjective:     Principal Problem:Phenytoin poisoning of undetermined intent    HPI:  89M with CAD, dementia, bipolar d/o, seizure d/o, pacemaker (heart block) presents from nursing home with AMS and fall. Patient reportedly fell at nursing home today and unclear if LOC occurred. He states that he "tripped" getting out of bed and he did not hit his head, but he is confused and somnolent. He denies pain. Patient also presented to the ED yesterday for a fall while walking to the dining raymond, and previous note remarks that nursing staff feels he is more lethargic and cannot ambulate as well as normal. Staff denies seizure-like activity, LOC.  In ED, he was noted to have supratherapeutic phenytoin levels. CT-head and c-spine negative, but a hematoma to the R side of the head was seen.    Hospital Course:  Patient with a fall overnight  without injury. Hip X-ray negative. On further discussion, patient states that he is at the end of his life, and is ready to be with his  wife. Discussed the concept of hospice with the patient, and he liked the idea of not returning to the hospital frequently. Will attempt to contact son to discuss this, and will place Palliative consult in the morning. On the morning of , phenytoin level was therapeutic. Pt started on 150 mg BID (). Plan to recheck phenytoin levels this evening .     Interval History: NAEON. Awake and alert this morning. AOx3. Denies any pain.      Review of Systems    Objective:     Vital Signs (Most Recent):  Temp: 98 °F (36.7 °C) (18 1419)  Pulse: 70 (18 1419)  Resp: 18 (18 1419)  BP: " (!) 145/65 (09/25/18 1419)  SpO2: (!) 94 % (09/25/18 1419) Vital Signs (24h Range):  Temp:  [98 °F (36.7 °C)-98.6 °F (37 °C)] 98 °F (36.7 °C)  Pulse:  [60-70] 70  Resp:  [15-20] 18  SpO2:  [94 %-96 %] 94 %  BP: (132-171)/(65-78) 145/65     Weight: 72.6 kg (160 lb)  Body mass index is 22.32 kg/m².    Intake/Output Summary (Last 24 hours) at 9/25/2018 1636  Last data filed at 9/25/2018 1000  Gross per 24 hour   Intake 720 ml   Output --   Net 720 ml      Physical Exam   Constitutional: No distress.   Pleasantly demented, elderly gentleman.   HENT:   Head: Normocephalic.   Facial abrasion healing well   Eyes: EOM are normal. No scleral icterus.   Neck: Normal range of motion.   Cardiovascular: Normal rate, regular rhythm, normal heart sounds and intact distal pulses.   Pulmonary/Chest: Effort normal and breath sounds normal.   Abdominal: Soft. He exhibits no distension. There is no tenderness.   Musculoskeletal: Normal range of motion. He exhibits no edema.   Neurological: He is alert.   Skin: Skin is warm and dry. Capillary refill takes less than 2 seconds.   Psychiatric: He has a normal mood and affect. His behavior is normal.       Significant Labs: All pertinent labs within the past 24 hours have been reviewed.    Significant Imaging: I have reviewed and interpreted all pertinent imaging results/findings within the past 24 hours.    Assessment/Plan:      * Phenytoin poisoning of undetermined intent    - Previous dose 200 mg BID  - Phenytoin held until level therapeutic on 9/23  - Started 150 mg BID  - Repeat phenytoin level in therapeutic range 14.8              Discharge planning issues    - Return to NH vs hospice per discussion with son  - Left message w/ son today regarding goals of care. Will follow-up tomorrow.   - Pt discharged to [facility]           Fall    89M with dementia, seizure d/o, and pacemaker presents with recurrent falls in nursing home. No acute head or musculoskeletal trauma noted on  physical exam. Negative head and c-spine CTs, negative hip X-ray. No seizure-like activity noted. High phenytoin levels seen. Presentation most consistent with arrythmias, orthostasis, progression of dementia and debility, possibly secondary to phenytoin toxicity.  - Patient continues to try to get out of bed on his own  - Holding seroquel and zoloft due to lethargy, will resume as appropriate  - PT/OT        Complete heart block    S/p pacemaker. EKG reveals paced rhythm        Dementia    Patient disoriented, with severely impaired memory.  - Continue donepezil        Seizure disorder    Phenytoin dosing per primary problem  - Continue home Keppra  - No seizures during admission thus far          VTE Risk Mitigation (From admission, onward)        Ordered     Place ELIJAH hose  Until discontinued      09/21/18 1148     IP VTE HIGH RISK PATIENT  Once      09/21/18 1148     Place sequential compression device  Until discontinued      09/21/18 1108              Marcell Carpenter MD  Department of Hospital Medicine   [facility]

## 2018-09-25 NOTE — CONSULTS
[facility]  Palliative Medicine  Consult Note    Patient Name: Jesus Beckham  MRN: [id number]  Admission Date: 9/21/2018  Hospital Length of Stay: 4 days  Code Status: Full Code   Attending Provider: Rachid Barcenas MD  Consulting Provider: ALVERTO Amaya  Primary Care Physician: Tulio Tovar MD  Principal Problem:Phenytoin poisoning of undetermined intent    .      Inpatient consult to Palliative Care  Consult performed by: ALVERTO Whitlock  Consult ordered by: Marcell Carpenter MD  Reason for consult: end of life hospice  Assessment/Recommendations: Palliative care consult received.  Palliative care will contact primary team prior to seeing patient.  Full consult to follow.    Thank you for consult and opportunity to participate in Mr. Beckham's care.   DEVI Camargo, ACNS-BC, OCN

## 2018-09-25 NOTE — ASSESSMENT & PLAN NOTE
"Palliative care consulted for end of life hospice.    Palliative care met with patient and sonVirgil at bedside.  Palliative care introduced to patient and family.     Impression:  Mr. Beckham is an 88 yo gentleman admitted with AMS after fall.  He is awake, alert,oriented to person, place, situation - knows he is in hospital.  Disoriented to time. Hx. Of dementia - appears to be FAST stage 6.   States no pain or discomforts and in no acute distress.  Phenytoin level within normal limits.      Goals of Care:   Advanced care planning:    - no advanced directives have been received.    - Next of kin for medical decision making is son Virgil Beckham [phone]  - When asked what kind of care he would choose to receive if his heart was not beating or breathing on his own, he states " I guess I would die and I am okay with that.  Hopefully I would go to [facility] to be with my wife"  - Son Virgil states they have discussed this and at [facility] he would not receive CPR.   -  Full code per primary team.   -  Son Virgil states he understands current clinical condition. Virgil states he had experience with hospice in the past and it has been a positive experience.  When the time is ready he would be amenable to hospice.    - Palliative care provided son with information regarding changes associated with dementia which indicate appropriateness for hospice    Plan   - Although it appears Mr. Beckham to be accepting of death, it does not appear his condition meets hospice criteria at this time.   - Patient may benefit from having the addition of home palliative care services in the nursing home.  Palliative [facility] and [facility] offer these services.  [facility] will have to approve.    - Emotional support provided.     Primary team and primary team  notified of above goals of care conversation.     "

## 2018-09-25 NOTE — SUBJECTIVE & OBJECTIVE
Interval History:     Past Medical History:   Diagnosis Date    Adjustment disorder with depressed mood     Arthritis     Atrioventricular block     Bipolar disorder     Bradycardia     Cancer     Cataract     OU    Coronary artery disease     Dementia     Depression     HEARING LOSS     High cholesterol     Hypertension     Orthostatic hypotension     Seizures        Past Surgical History:   Procedure Laterality Date    ADENOIDECTOMY      CARDIAC PACEMAKER PLACEMENT      COLONOSCOPY      INSERTION-PACEMAKER-DUAL Left 2015    Performed by Ezequiel Carrillo MD at [facility] CATH LAB    TONSILLECTOMY         Review of patient's allergies indicates:  No Known Allergies    Medications:  Continuous Infusions:  Scheduled Meds:   ascorbic acid (vitamin C)  500 mg Oral QHS    donepezil  5 mg Oral QHS    levETIRAcetam  500 mg Oral BID    phenytoin  150 mg Oral BID    potassium chloride  40 mEq Oral Once    tamsulosin  0.4 mg Oral Daily     PRN Meds:acetaminophen, dextrose 50%, dextrose 50%, glucagon (human recombinant), glucose, glucose, sodium chloride 0.9%    Family History     None        Tobacco Use    Smoking status: Former Smoker     Last attempt to quit: 3/13/1982     Years since quittin.5    Smokeless tobacco: Never Used   Substance and Sexual Activity    Alcohol use: No    Drug use: No    Sexual activity: No       Review of Systems   Constitutional: Positive for appetite change.   Respiratory: Negative for cough and shortness of breath.    Cardiovascular: Negative for chest pain and leg swelling.   Gastrointestinal: Negative for nausea.   Skin: Positive for pallor.   Neurological: Positive for weakness.   Psychiatric/Behavioral: Negative for agitation and confusion.     Objective:     Vital Signs (Most Recent):  Temp: 98.3 °F (36.8 °C) (18)  Pulse: 70 (18)  Resp: 15 (18)  BP: (!) 171/78 (18)  SpO2: 95 % (18) Vital Signs (24h  Range):  Temp:  [97.4 °F (36.3 °C)-98.6 °F (37 °C)] 98.3 °F (36.8 °C)  Pulse:  [60-70] 70  Resp:  [15-20] 15  SpO2:  [94 %-97 %] 95 %  BP: (132-171)/(64-78) 171/78     Weight: 72.6 kg (160 lb)  Body mass index is 22.32 kg/m².    Review of Symptoms  Symptom Assessment (ESAS 0-10 scale)   ESAS 0 1 2 3 4 5 6 7 8 9 10   Pain X             Dyspnea X             Anxiety X             Nausea X             Depression  X             Anorexia X             Fatigue X             Insomnia X             Restlessness  X             Agitation X             CAM / Delirium _negative    Dementia - FAST stage 6  Bowel Management Plan (BMP): No    Comments: No complaints of pain, shortness of breath/dypnea     Pain Assessment: no pain  OME in 24 hours: 0    Performance Status: 60    ECOG Performance Status Grade: 2 - Ambulates, capable of self care only    Physical Exam   Constitutional: He appears well-developed. No distress.   Cardiovascular: Normal rate, regular rhythm, normal heart sounds and intact distal pulses.   Pulmonary/Chest: Effort normal and breath sounds normal. No respiratory distress.   Abdominal: Soft. Bowel sounds are normal.   Musculoskeletal: He exhibits no edema.   Neurological: He is alert.   Oriented to person, place    Skin: Skin is warm and dry. There is pallor.   Bruises to elbow    Psychiatric: He has a normal mood and affect. His behavior is normal. Judgment and thought content normal.   Nursing note and vitals reviewed.      Significant Labs: All pertinent labs within the past 24 hours have been reviewed.  CBC:   Recent Labs   Lab  09/25/18   0335   WBC  7.01   HGB  12.9*   HCT  38.5*   MCV  96   PLT  187     BMP:  Recent Labs   Lab  09/25/18   0336   GLU  83   NA  138   K  3.6   CL  106   CO2  23   BUN  13   CREATININE  0.7   CALCIUM  8.3*     LFT:  Lab Results   Component Value Date    AST 20 09/25/2018    ALKPHOS 100 09/25/2018    BILITOT 0.5 09/25/2018     Albumin:   Albumin   Date Value Ref Range  Status   2018 3.0 (L) 3.5 - 5.2 g/dL Final     Protein:   Total Protein   Date Value Ref Range Status   2018 6.6 6.0 - 8.4 g/dL Final     Lactic acid:   No results found for: LACTATE    Significant Imaging: I have reviewed all pertinent imaging results/findings within the past 24 hours.    Advanced Directives::  Living Will: No  LaPOST: No  Do Not Resuscitate Status: No  Medical Power of : No    Decision-Making Capacity: Patient answered questions    Living Arrangements: Lives in nursing home    Psychosocial/Cultural: , three children, two from 1st marriage -  . Son Virgil from second marriage  is his only living relative.  Three grandchildren.  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  Enjoys watching television and talking with other residents at the nursing home.       Spiritual:     F- Britt and Belief: Pentecostalism   I - Importance: .  C - Community:    A - Address in Care: would like to receive anointing of the sick

## 2018-09-25 NOTE — PT/OT/SLP PROGRESS
Physical Therapy      Patient Name:  Jesus Beckham   MRN:  [id number]    Patient not seen today secondary to (pt. stated he had too much discomfort in LEs to participate with PT today). Will follow-up tomorrow, if pt. not discharged.    Jan Blair, PT   9/25/2018

## 2018-09-25 NOTE — PLAN OF CARE
4:25 PM  CHUCK notified RN [id number] of the transport accomodation and the info in note below.  RN confirmed Pt can transport in wheel chair.    4:24 PM  CHUCK received the following note in RC:  call report to room 315 at the main number. [phone].    4:19 PM  CHUCK arranged transport w/Angela rep at [facility] for  at 7:30pm to transport back to [facility].[address] [place] [address].    2:29 PM  CHUCK f/u w/Alethea t [facility] reported they will need to review Orders but will accept Pt back.  CHUCK notified doctor's Orders are needed.    10:48 AM  CHUCK returned phone call to Alethea at [facility].  Informed her Pt is anticipated to d/c today pending lab test results.  CHUCK informed Alethea will notify her when d/c Orders are in.    CHUCK following for DC needs.  CHUCK in communication with GURWINDER Nunez, MYKE  [facility]

## 2018-09-25 NOTE — PLAN OF CARE
[facility]    FACILITY TRANSFER ORDERS      Patient Name: Jesus Beckham  YOB: 1929    PCP: Tulio Tovar MD   PCP Address: [address] / [place]  PCP Phone Number: [phone]  PCP Fax: [phone]    Encounter Date: 09/25/2018    Admit to: [facility]      Vital Signs:  Routine    Diagnoses:   Active Hospital Problems    Diagnosis  POA    *Phenytoin poisoning of undetermined intent [T42.0X4A]  Yes    Palliative care encounter [Z51.5]  Not Applicable    Discharge planning issues [Z02.9]  Not Applicable    Fall [W19.XXXA]  Yes    Complete heart block [I44.2]  Yes    Seizure disorder [G40.909]  Yes    Dementia [F03.90]  Yes    CAD (coronary artery disease) [I25.10]  Yes     Chronic      Resolved Hospital Problems    Diagnosis Date Resolved POA    Altered mental status [R41.82] 09/25/2018 Yes       Allergies:Review of patient's allergies indicates:  No Known Allergies    Diet: regular diet    Activities: Activity as tolerated    Nursing: N/A     Labs: CBC Once     CONSULTS:    Physical Therapy to evaluate and treat. , Occupational Therapy to evaluate and treat. and  to evaluate for community resources/long-range planning.    MISCELLANEOUS CARE:      WOUND CARE ORDERS  None    Medications: Review discharge medications with patient and family and provide education.      Current Discharge Medication List      CONTINUE these medications which have CHANGED    Details   phenytoin (DILANTIN) 30 MG ER capsule Take 5 capsules (150 mg total) by mouth 2 (two) times daily. Dose decreased on 09/23/2018.  Please draw phenytoin level 30 minutes PRIOR to the AM dose on 10/01/2018 and adjust dose accordingly. Goal total phenytoin level adjusted for albumin 10-20 mg/L.         CONTINUE these medications which have NOT CHANGED    Details   acetaminophen (TYLENOL) 500 MG tablet Take 500 mg by mouth every 6 (six) hours as needed for Pain.        ascorbic acid (VITAMIN C) 500 MG tablet Take 500 mg by mouth every evening.      atorvastatin (LIPITOR) 40 MG tablet Take 40 mg by mouth every evening.      bisacodyl (DULCOLAX, BISACODYL,) 10 mg Supp Place 10 mg rectally daily as needed (constipation).       CALCIUM CARBONATE (CALCIUM 600 ORAL) Take 2 tablets by mouth once daily.      carvedilol (COREG) 3.125 MG tablet Take 3.125 mg by mouth 2 (two) times daily.      chlorhexidine (PERIDEX) 0.12 % solution Swish and spit every evening      clotrimazole-betamethasone 1-0.05% (LOTRISONE) cream Apply to facial rash twice daily      donepezil (ARICEPT) 5 MG tablet Take 5 mg by mouth every evening.      lactulose (CHRONULAC) 10 gram/15 mL solution Take 10 g by mouth once daily.      levetiracetam (KEPPRA) 500 MG Tab Take 1 tablet (500 mg total) by mouth 2 (two) times daily. 1 Tablet Oral Twice a day  Qty: 60 tablet, Refills: 5      linaclotide (LINZESS) 72 mcg Cap Take 1 tablet by mouth once daily      multivitamin (THERAGRAN) per tablet Take 1 tablet by mouth once daily.      potassium chloride SA (K-DUR,KLOR-CON) 20 MEQ tablet Take 20 mEq by mouth every evening.      tamsulosin (FLOMAX) 0.4 mg Cp24 Take 1 capsule (0.4 mg total) by mouth once daily.  Qty: 30 capsule, Refills: 1                  _________________________________  Obinna Schilling MD  09/25/2018

## 2018-09-25 NOTE — CONSULTS
"[facility]  Palliative Medicine  Consult Note    Patient Name: Jesus Beckham  MRN: [id number]  Admission Date: 9/21/2018  Hospital Length of Stay: 4 days  Code Status: Full Code   Attending Provider: Rachid Barcenas MD  Consulting Provider: ALVERTO Amaya  Primary Care Physician: Tulio Tovar MD  Principal Problem:Phenytoin poisoning of undetermined intent    Patient information was obtained from patient, relative(s), past medical records and ER records.      Consults  Assessment/Plan:     Palliative care encounter    Palliative care consulted for end of life hospice.    Palliative care met with patient and sonVirgil at bedside.  Palliative care introduced to patient and family.     Impression:  Mr. Beckham is an 88 yo gentleman admitted with AMS after fall.  He is awake, alert,oriented to person, place, situation - knows he is in hospital.  Disoriented to time. Hx. Of dementia - appears to be FAST stage 6.   States no pain or discomforts and in no acute distress.  Phenytoin level within normal limits.      Goals of Care:   Advanced care planning:    - no advanced directives have been received.    - Next of kin for medical decision making is son Virgil Beckham [phone]  - When asked what kind of care he would choose to receive if his heart was not beating or breathing on his own, he states " I guess I would die and I am okay with that.  Hopefully I would go to [facility] to be with my wife"  - Son Virgil states they have discussed this and at [facility] he would not receive CPR.   -  Full code per primary team.   -  Son Virgil states he understands current clinical condition. Virgil states he had experience with hospice in the past and it has been a positive experience.  When the time is ready he would be amenable to hospice.    - Palliative care provided son with information regarding changes associated with dementia which indicate appropriateness for hospice    Plan   - Although it " appears Mr. Beckham to be accepting of death, it does not appear his condition meets hospice criteria at this time.   - Patient may benefit from having the addition of home palliative care services in the nursing home.  Palliative [facility] and [facility] offer these services.  [facility] will have to approve.    - Emotional support provided.     Primary team and primary team  notified of above goals of care conversation.               Thank you for your consult. I will follow-up with patient. Please contact us if you have any additional questions.    Subjective:     HPI:    Mr. Beckham is an 89 yo gentleman with PMH of  CAD, dementia, bipolar d/o, seizure d/o, pacemaker (heart block). He presented to [facility] from nursing home, [facility], with AMS and fall.  Patient reports he tripped getting out of bed and fell.  As per patient he did not hit his head. It is unclear if there was LOC.  He is confused and somnolent. States no  pain.  Nursing home staff documents patient has fallen walking in the dining room and that he has been more lethargic and not ambulating as normal. Staff denies seizure-like activity, LOC.    In ED, found to have  supratherapeutic phenytoin levels. CT-head and c-spine negative,  hematoma to the R side of the head.     Palliative care consulted for end of life hospice.       Hospital Course:  No notes on file    Interval History:     Past Medical History:   Diagnosis Date    Adjustment disorder with depressed mood     Arthritis     Atrioventricular block     Bipolar disorder     Bradycardia     Cancer     Cataract     OU    Coronary artery disease     Dementia     Depression     HEARING LOSS     High cholesterol     Hypertension     Orthostatic hypotension     Seizures        Past Surgical History:   Procedure Laterality Date    ADENOIDECTOMY      CARDIAC PACEMAKER PLACEMENT      COLONOSCOPY      INSERTION-PACEMAKER-DUAL Left 12/28/2015    Performed by  Ezequiel Carrillo MD at [facility] CATH LAB    TONSILLECTOMY         Review of patient's allergies indicates:  No Known Allergies    Medications:  Continuous Infusions:  Scheduled Meds:   ascorbic acid (vitamin C)  500 mg Oral QHS    donepezil  5 mg Oral QHS    levETIRAcetam  500 mg Oral BID    phenytoin  150 mg Oral BID    potassium chloride  40 mEq Oral Once    tamsulosin  0.4 mg Oral Daily     PRN Meds:acetaminophen, dextrose 50%, dextrose 50%, glucagon (human recombinant), glucose, glucose, sodium chloride 0.9%    Family History     None        Tobacco Use    Smoking status: Former Smoker     Last attempt to quit: 3/13/1982     Years since quittin.5    Smokeless tobacco: Never Used   Substance and Sexual Activity    Alcohol use: No    Drug use: No    Sexual activity: No       Review of Systems   Constitutional: Positive for appetite change.   Respiratory: Negative for cough and shortness of breath.    Cardiovascular: Negative for chest pain and leg swelling.   Gastrointestinal: Negative for nausea.   Skin: Positive for pallor.   Neurological: Positive for weakness.   Psychiatric/Behavioral: Negative for agitation and confusion.     Objective:     Vital Signs (Most Recent):  Temp: 98.3 °F (36.8 °C) (18)  Pulse: 70 (18)  Resp: 15 (18)  BP: (!) 171/78 (18)  SpO2: 95 % (18) Vital Signs (24h Range):  Temp:  [97.4 °F (36.3 °C)-98.6 °F (37 °C)] 98.3 °F (36.8 °C)  Pulse:  [60-70] 70  Resp:  [15-20] 15  SpO2:  [94 %-97 %] 95 %  BP: (132-171)/(64-78) 171/78     Weight: 72.6 kg (160 lb)  Body mass index is 22.32 kg/m².    Review of Symptoms  Symptom Assessment (ESAS 0-10 scale)   ESAS 0 1 2 3 4 5 6 7 8 9 10   Pain X             Dyspnea X             Anxiety X             Nausea X             Depression  X             Anorexia X             Fatigue X             Insomnia X             Restlessness  X             Agitation X             CAM / Delirium _negative     Dementia - FAST stage 6  Bowel Management Plan (BMP): No    Comments: No complaints of pain, shortness of breath/dypnea     Pain Assessment: no pain  OME in 24 hours: 0    Performance Status: 60    ECOG Performance Status Grade: 2 - Ambulates, capable of self care only    Physical Exam   Constitutional: He appears well-developed. No distress.   Cardiovascular: Normal rate, regular rhythm, normal heart sounds and intact distal pulses.   Pulmonary/Chest: Effort normal and breath sounds normal. No respiratory distress.   Abdominal: Soft. Bowel sounds are normal.   Musculoskeletal: He exhibits no edema.   Neurological: He is alert.   Oriented to person, place    Skin: Skin is warm and dry. There is pallor.   Bruises to elbow    Psychiatric: He has a normal mood and affect. His behavior is normal. Judgment and thought content normal.   Nursing note and vitals reviewed.      Significant Labs: All pertinent labs within the past 24 hours have been reviewed.  CBC:   Recent Labs   Lab  09/25/18   0335   WBC  7.01   HGB  12.9*   HCT  38.5*   MCV  96   PLT  187     BMP:  Recent Labs   Lab  09/25/18   0336   GLU  83   NA  138   K  3.6   CL  106   CO2  23   BUN  13   CREATININE  0.7   CALCIUM  8.3*     LFT:  Lab Results   Component Value Date    AST 20 09/25/2018    ALKPHOS 100 09/25/2018    BILITOT 0.5 09/25/2018     Albumin:   Albumin   Date Value Ref Range Status   09/25/2018 3.0 (L) 3.5 - 5.2 g/dL Final     Protein:   Total Protein   Date Value Ref Range Status   09/25/2018 6.6 6.0 - 8.4 g/dL Final     Lactic acid:   No results found for: LACTATE    Significant Imaging: I have reviewed all pertinent imaging results/findings within the past 24 hours.    Advanced Directives::  Living Will: No  LaPOST: No  Do Not Resuscitate Status: No  Medical Power of : No    Decision-Making Capacity: Patient answered questions    Living Arrangements: Lives in nursing home    Psychosocial/Cultural: , three children, two from  1st marriage -  . Son Virgil from second marriage  is his only living relative.  Three grandchildren.  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  Enjoys watching television and talking with other residents at the nursing home.       Spiritual:     F- Britt and Belief: Religious   I - Importance: .  C - Community:    A - Address in Care: would like to receive anointing of the sick      > 50% of 70 min visit spent in chart review, face to face discussion of goals of care,  symptom assessment, coordination of care and emotional support.    Olga Huber, CNS  Palliative Medicine  [facility]

## 2018-09-25 NOTE — SUBJECTIVE & OBJECTIVE
Interval History: NAEON. Awake and alert this morning. AOx3. Denies any pain.      Review of Systems    Objective:     Vital Signs (Most Recent):  Temp: 98 °F (36.7 °C) (09/25/18 1419)  Pulse: 70 (09/25/18 1419)  Resp: 18 (09/25/18 1419)  BP: (!) 145/65 (09/25/18 1419)  SpO2: (!) 94 % (09/25/18 1419) Vital Signs (24h Range):  Temp:  [98 °F (36.7 °C)-98.6 °F (37 °C)] 98 °F (36.7 °C)  Pulse:  [60-70] 70  Resp:  [15-20] 18  SpO2:  [94 %-96 %] 94 %  BP: (132-171)/(65-78) 145/65     Weight: 72.6 kg (160 lb)  Body mass index is 22.32 kg/m².    Intake/Output Summary (Last 24 hours) at 9/25/2018 1636  Last data filed at 9/25/2018 1000  Gross per 24 hour   Intake 720 ml   Output --   Net 720 ml      Physical Exam   Constitutional: No distress.   Pleasantly demented, elderly gentleman.   HENT:   Head: Normocephalic.   Facial abrasion healing well   Eyes: EOM are normal. No scleral icterus.   Neck: Normal range of motion.   Cardiovascular: Normal rate, regular rhythm, normal heart sounds and intact distal pulses.   Pulmonary/Chest: Effort normal and breath sounds normal.   Abdominal: Soft. He exhibits no distension. There is no tenderness.   Musculoskeletal: Normal range of motion. He exhibits no edema.   Neurological: He is alert.   Skin: Skin is warm and dry. Capillary refill takes less than 2 seconds.   Psychiatric: He has a normal mood and affect. His behavior is normal.       Significant Labs: All pertinent labs within the past 24 hours have been reviewed.    Significant Imaging: I have reviewed and interpreted all pertinent imaging results/findings within the past 24 hours.

## 2018-09-25 NOTE — PLAN OF CARE
Problem: Fall Risk (Adult)  Goal: Identify Related Risk Factors and Signs and Symptoms  Related risk factors and signs and symptoms are identified upon initiation of Human Response Clinical Practice Guideline (CPG)  Outcome: Ongoing (interventions implemented as appropriate)   09/25/18 1611   Fall Risk   Related Risk Factors (Fall Risk) bladder function altered;confusion/agitation;depression/anxiety;gait/mobility problems;history of falls;neuro disease/injury   Signs and Symptoms (Fall Risk) presence of risk factors       Problem: Patient Care Overview  Goal: Plan of Care Review  Outcome: Ongoing (interventions implemented as appropriate)   09/25/18 1611   Coping/Psychosocial   Plan Of Care Reviewed With patient       Problem: Pressure Ulcer Risk (Kali Scale) (Adult,Obstetrics,Pediatric)  Goal: Identify Related Risk Factors and Signs and Symptoms  Related risk factors and signs and symptoms are identified upon initiation of Human Response Clinical Practice Guideline (CPG)  Outcome: Ongoing (interventions implemented as appropriate)   09/25/18 1611   Pressure Ulcer Risk (Kali Scale)   Related Risk Factors (Pressure Ulcer Risk (Kali Scale)) fluid intake inadequate;medical devices;nutritional deficiencies

## 2018-09-25 NOTE — HPI
Mr. Beckham is an 88 yo gentleman with PMH of  CAD, dementia, bipolar d/o, seizure d/o, pacemaker (heart block). He presented to [facility] from nursing home, [facility], with AMS and fall.  Patient reports he tripped getting out of bed and fell.  As per patient he did not hit his head. It is unclear if there was LOC.  He is confused and somnolent. States no  pain.  Nursing home staff documents patient has fallen walking in the dining room and that he has been more lethargic and not ambulating as normal. Staff denies seizure-like activity, LOC.    In ED, found to have  supratherapeutic phenytoin levels. CT-head and c-spine negative,  hematoma to the R side of the head.     Palliative care consulted for end of life hospice.

## 2018-09-25 NOTE — ASSESSMENT & PLAN NOTE
- Previous dose 200 mg BID  - Phenytoin held until level therapeutic on 9/23  - Started 150 mg BID  - Repeat phenytoin level in therapeutic range 14.8

## 2018-09-25 NOTE — PLAN OF CARE
Problem: Patient Care Overview  Goal: Plan of Care Review  Outcome: Ongoing (interventions implemented as appropriate)   09/25/18 0405   Coping/Psychosocial   Plan Of Care Reviewed With patient     Patient AA O x 1-2 with periods of confusion.  Patient requires reorientation to place, time and situation.  Ambulated to toilet with assist of 1,  Patient reported hip discomfort, ordered received on 9/23/15 for acetaminophen 650 mg po every 6 hours prn pain.  Effectiveness noted.  Patient presents with poor safety awareness and tele sitter is in room for added safety.  Dilantin level on  9/24/18 at 0000: 14.2 [10.0-20.0]. Safety precautions in place; call light within reach, bed in low position, wheels locked, side rails up x2. Will continue to monitor.            Problem: Pain, Chronic (Adult)  Goal: Identify Related Risk Factors and Signs and Symptoms  Related risk factors and signs and symptoms are identified upon initiation of Human Response Clinical Practice Guideline (CPG)  Outcome: Ongoing (interventions implemented as appropriate)   09/25/18 0405   Pain, Chronic   Related Risk Factors (Chronic Pain) disease process   Signs and Symptoms (Chronic Pain) fatigue/weakness;impaired thought process/concentration;verbalization of pain descriptors

## 2018-09-25 NOTE — NURSING
Attempted to call report to [facility] continue to be placed on hold and transferred x3 times without any answer.

## 2018-09-25 NOTE — ASSESSMENT & PLAN NOTE
- Return to NH vs hospice per discussion with son  - Left message w/ son today regarding goals of care. Will follow-up tomorrow.   - Pt discharged to [facility]

## 2018-09-28 NOTE — PT/OT/SLP DISCHARGE
Physical Therapy Discharge Summary    Name: Jesus Beckham  MRN: [id number]   Principal Problem: Phenytoin poisoning of undetermined intent     Patient Discharged from acute Physical Therapy on 18.  Please refer to prior PT noted date on 18 for functional status.     Assessment:     Patient appropriate for care in another setting.    Objective:     GOALS:   Multidisciplinary Problems     Physical Therapy Goals        Problem: Physical Therapy Goal    Goal Priority Disciplines Outcome Goal Variances Interventions   Physical Therapy Goal     PT, PT/OT Ongoing (interventions implemented as appropriate)     Description:  Goals to be met by: 10/3/18     Patient will increase functional independence with mobility by performin. Supine to sit with Contact Guard Assistance  2. Sit to supine with Contact Guard Assistance  3. Sit to stand transfer with Stand-by Assistance  4. Bed to chair transfer with Contact Guard Assistance using Rolling Walker  5. Gait  x 200 feet with Contact Guard Assistance using Rolling Walker.                       Reasons for Discontinuation of Therapy Services  Transfer to alternate level of care.      Plan:     Patient Discharged to: NH.    Manuel Gomez, PT  2018

## 2019-02-25 ENCOUNTER — HOSPITAL ENCOUNTER (OUTPATIENT)
Facility: HOSPITAL | Age: 84
End: 2019-02-28
Attending: EMERGENCY MEDICINE | Admitting: EMERGENCY MEDICINE
Payer: MEDICARE

## 2019-02-25 DIAGNOSIS — M25.551 RIGHT HIP PAIN: ICD-10-CM

## 2019-02-25 DIAGNOSIS — L03.317 CELLULITIS OF BUTTOCK, RIGHT: Primary | ICD-10-CM
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
## 2019-02-25 PROBLEM — I10 ESSENTIAL HYPERTENSION: Chronic | Status: ACTIVE | Noted: 2019-02-25

## 2019-02-25 PROBLEM — Z75.8 DISCHARGE PLANNING ISSUES: Status: RESOLVED | Noted: 2018-09-23 | Resolved: 2019-02-25

## 2019-02-25 PROBLEM — Z51.5 PALLIATIVE CARE ENCOUNTER: Status: RESOLVED | Noted: 2018-09-25 | Resolved: 2019-02-25

## 2019-02-25 LAB
ALBUMIN SERPL BCP-MCNC: 3.5 G/DL
ALP SERPL-CCNC: 133 U/L
ALT SERPL W/O P-5'-P-CCNC: 14 U/L
ANION GAP SERPL CALC-SCNC: 11 MMOL/L
AST SERPL-CCNC: 18 U/L
BASOPHILS # BLD AUTO: 0.04 K/UL
BASOPHILS NFR BLD: 0.3 %
BILIRUB SERPL-MCNC: 0.5 MG/DL
BILIRUB UR QL STRIP: NEGATIVE
BUN SERPL-MCNC: 18 MG/DL
CALCIUM SERPL-MCNC: 9.2 MG/DL
CHLORIDE SERPL-SCNC: 106 MMOL/L
CK SERPL-CCNC: 75 U/L
CLARITY UR REFRACT.AUTO: CLEAR
CO2 SERPL-SCNC: 24 MMOL/L
COLOR UR AUTO: YELLOW
CREAT SERPL-MCNC: 1.1 MG/DL
CRP SERPL-MCNC: 195.6 MG/L
DIFFERENTIAL METHOD: ABNORMAL
EOSINOPHIL # BLD AUTO: 0 K/UL
EOSINOPHIL NFR BLD: 0.3 %
ERYTHROCYTE [DISTWIDTH] IN BLOOD BY AUTOMATED COUNT: 13.2 %
ERYTHROCYTE [SEDIMENTATION RATE] IN BLOOD BY WESTERGREN METHOD: 10 MM/HR
EST. GFR  (AFRICAN AMERICAN): >60 ML/MIN/1.73 M^2
EST. GFR  (NON AFRICAN AMERICAN): 58.8 ML/MIN/1.73 M^2
GLUCOSE SERPL-MCNC: 117 MG/DL
GLUCOSE UR QL STRIP: NEGATIVE
HCT VFR BLD AUTO: 44.5 %
HGB BLD-MCNC: 15 G/DL
HGB UR QL STRIP: NEGATIVE
IMM GRANULOCYTES # BLD AUTO: 0.06 K/UL
IMM GRANULOCYTES NFR BLD AUTO: 0.4 %
INR PPP: 1.1
KETONES UR QL STRIP: NEGATIVE
LACTATE SERPL-SCNC: 1.2 MMOL/L
LEUKOCYTE ESTERASE UR QL STRIP: ABNORMAL
LYMPHOCYTES # BLD AUTO: 1.8 K/UL
LYMPHOCYTES NFR BLD: 12.3 %
MCH RBC QN AUTO: 32.2 PG
MCHC RBC AUTO-ENTMCNC: 33.7 G/DL
MCV RBC AUTO: 96 FL
MICROSCOPIC COMMENT: ABNORMAL
MONOCYTES # BLD AUTO: 1.7 K/UL
MONOCYTES NFR BLD: 11.5 %
NEUTROPHILS # BLD AUTO: 11 K/UL
NEUTROPHILS NFR BLD: 75.2 %
NITRITE UR QL STRIP: NEGATIVE
NRBC BLD-RTO: 0 /100 WBC
PH UR STRIP: 5 [PH] (ref 5–8)
PHENYTOIN SERPL-MCNC: 9.6 UG/ML
PLATELET # BLD AUTO: 196 K/UL
PMV BLD AUTO: 10.6 FL
POTASSIUM SERPL-SCNC: 3.8 MMOL/L
PROT SERPL-MCNC: 7.9 G/DL
PROT UR QL STRIP: NEGATIVE
PROTHROMBIN TIME: 11.1 SEC
RBC # BLD AUTO: 4.66 M/UL
RBC #/AREA URNS AUTO: 6 /HPF (ref 0–4)
SODIUM SERPL-SCNC: 141 MMOL/L
SP GR UR STRIP: 1.02 (ref 1–1.03)
SQUAMOUS #/AREA URNS AUTO: 1 /HPF
URN SPEC COLLECT METH UR: ABNORMAL
WBC # BLD AUTO: 14.56 K/UL
WBC #/AREA URNS AUTO: 8 /HPF (ref 0–5)

## 2019-02-25 PROCEDURE — 96374 THER/PROPH/DIAG INJ IV PUSH: CPT

## 2019-02-25 PROCEDURE — 87040 BLOOD CULTURE FOR BACTERIA: CPT | Mod: 59

## 2019-02-25 PROCEDURE — 80053 COMPREHEN METABOLIC PANEL: CPT

## 2019-02-25 PROCEDURE — 85652 RBC SED RATE AUTOMATED: CPT

## 2019-02-25 PROCEDURE — 99220 PR INITIAL OBSERVATION CARE,LEVL III: CPT | Mod: ,,, | Performed by: PHYSICIAN ASSISTANT

## 2019-02-25 PROCEDURE — 63600175 PHARM REV CODE 636 W HCPCS: Performed by: PHYSICIAN ASSISTANT

## 2019-02-25 PROCEDURE — 83605 ASSAY OF LACTIC ACID: CPT

## 2019-02-25 PROCEDURE — 86140 C-REACTIVE PROTEIN: CPT

## 2019-02-25 PROCEDURE — 93010 ELECTROCARDIOGRAM REPORT: CPT | Mod: ,,, | Performed by: INTERNAL MEDICINE

## 2019-02-25 PROCEDURE — 99285 EMERGENCY DEPT VISIT HI MDM: CPT | Mod: 25

## 2019-02-25 PROCEDURE — 93005 ELECTROCARDIOGRAM TRACING: CPT

## 2019-02-25 PROCEDURE — 93010 EKG 12-LEAD: ICD-10-PCS | Mod: ,,, | Performed by: INTERNAL MEDICINE

## 2019-02-25 PROCEDURE — 99285 EMERGENCY DEPT VISIT HI MDM: CPT | Mod: ,,, | Performed by: EMERGENCY MEDICINE

## 2019-02-25 PROCEDURE — G0378 HOSPITAL OBSERVATION PER HR: HCPCS

## 2019-02-25 PROCEDURE — 80177 DRUG SCRN QUAN LEVETIRACETAM: CPT

## 2019-02-25 PROCEDURE — 99220 PR INITIAL OBSERVATION CARE,LEVL III: ICD-10-PCS | Mod: ,,, | Performed by: PHYSICIAN ASSISTANT

## 2019-02-25 PROCEDURE — 85025 COMPLETE CBC W/AUTO DIFF WBC: CPT

## 2019-02-25 PROCEDURE — 82550 ASSAY OF CK (CPK): CPT

## 2019-02-25 PROCEDURE — 99285 PR EMERGENCY DEPT VISIT,LEVEL V: ICD-10-PCS | Mod: ,,, | Performed by: EMERGENCY MEDICINE

## 2019-02-25 PROCEDURE — 81001 URINALYSIS AUTO W/SCOPE: CPT

## 2019-02-25 PROCEDURE — 85610 PROTHROMBIN TIME: CPT

## 2019-02-25 PROCEDURE — 80185 ASSAY OF PHENYTOIN TOTAL: CPT

## 2019-02-25 RX ORDER — ACETAMINOPHEN 325 MG/1
650 TABLET ORAL EVERY 4 HOURS PRN
Status: DISCONTINUED | OUTPATIENT
Start: 2019-02-25 | End: 2019-03-01 | Stop reason: HOSPADM

## 2019-02-25 RX ORDER — SODIUM CHLORIDE 0.9 % (FLUSH) 0.9 %
5 SYRINGE (ML) INJECTION
Status: DISCONTINUED | OUTPATIENT
Start: 2019-02-25 | End: 2019-03-01 | Stop reason: HOSPADM

## 2019-02-25 RX ORDER — CALCIUM CARBONATE 500(1250)
500 TABLET ORAL DAILY
Status: DISCONTINUED | OUTPATIENT
Start: 2019-02-26 | End: 2019-03-01 | Stop reason: HOSPADM

## 2019-02-25 RX ORDER — AMOXICILLIN 250 MG
1 CAPSULE ORAL 2 TIMES DAILY
Status: DISCONTINUED | OUTPATIENT
Start: 2019-02-25 | End: 2019-03-01 | Stop reason: HOSPADM

## 2019-02-25 RX ORDER — GLUCAGON 1 MG
1 KIT INJECTION
Status: DISCONTINUED | OUTPATIENT
Start: 2019-02-25 | End: 2019-03-01 | Stop reason: HOSPADM

## 2019-02-25 RX ORDER — IBUPROFEN 200 MG
16 TABLET ORAL
Status: DISCONTINUED | OUTPATIENT
Start: 2019-02-25 | End: 2019-03-01 | Stop reason: HOSPADM

## 2019-02-25 RX ORDER — BISACODYL 10 MG
10 SUPPOSITORY, RECTAL RECTAL DAILY PRN
Status: DISCONTINUED | OUTPATIENT
Start: 2019-02-25 | End: 2019-03-01 | Stop reason: HOSPADM

## 2019-02-25 RX ORDER — ASCORBIC ACID 500 MG
500 TABLET ORAL NIGHTLY
Status: DISCONTINUED | OUTPATIENT
Start: 2019-02-26 | End: 2019-03-01 | Stop reason: HOSPADM

## 2019-02-25 RX ORDER — ATORVASTATIN CALCIUM 20 MG/1
40 TABLET, FILM COATED ORAL NIGHTLY
Status: DISCONTINUED | OUTPATIENT
Start: 2019-02-26 | End: 2019-03-01 | Stop reason: HOSPADM

## 2019-02-25 RX ORDER — CHLORHEXIDINE GLUCONATE ORAL RINSE 1.2 MG/ML
15 SOLUTION DENTAL NIGHTLY
Status: DISCONTINUED | OUTPATIENT
Start: 2019-02-26 | End: 2019-03-01 | Stop reason: HOSPADM

## 2019-02-25 RX ORDER — LEVETIRACETAM 500 MG/1
500 TABLET ORAL 2 TIMES DAILY
Status: DISCONTINUED | OUTPATIENT
Start: 2019-02-26 | End: 2019-03-01 | Stop reason: HOSPADM

## 2019-02-25 RX ORDER — RAMELTEON 8 MG/1
8 TABLET ORAL NIGHTLY PRN
Status: DISCONTINUED | OUTPATIENT
Start: 2019-02-25 | End: 2019-03-01 | Stop reason: HOSPADM

## 2019-02-25 RX ORDER — IBUPROFEN 200 MG
24 TABLET ORAL
Status: DISCONTINUED | OUTPATIENT
Start: 2019-02-25 | End: 2019-03-01 | Stop reason: HOSPADM

## 2019-02-25 RX ORDER — LACTULOSE 10 G/15ML
10 SOLUTION ORAL DAILY
Status: DISCONTINUED | OUTPATIENT
Start: 2019-02-26 | End: 2019-03-01 | Stop reason: HOSPADM

## 2019-02-25 RX ORDER — IPRATROPIUM BROMIDE AND ALBUTEROL SULFATE 2.5; .5 MG/3ML; MG/3ML
3 SOLUTION RESPIRATORY (INHALATION) EVERY 4 HOURS PRN
Status: DISCONTINUED | OUTPATIENT
Start: 2019-02-25 | End: 2019-03-01 | Stop reason: HOSPADM

## 2019-02-25 RX ORDER — CEFAZOLIN SODIUM 1 G/3ML
1 INJECTION, POWDER, FOR SOLUTION INTRAMUSCULAR; INTRAVENOUS ONCE
Status: COMPLETED | OUTPATIENT
Start: 2019-02-25 | End: 2019-02-25

## 2019-02-25 RX ORDER — POTASSIUM CHLORIDE 20 MEQ/1
20 TABLET, EXTENDED RELEASE ORAL NIGHTLY
Status: DISCONTINUED | OUTPATIENT
Start: 2019-02-26 | End: 2019-03-01 | Stop reason: HOSPADM

## 2019-02-25 RX ORDER — DONEPEZIL HYDROCHLORIDE 5 MG/1
5 TABLET, FILM COATED ORAL NIGHTLY
Status: DISCONTINUED | OUTPATIENT
Start: 2019-02-26 | End: 2019-03-01 | Stop reason: HOSPADM

## 2019-02-25 RX ORDER — CEFAZOLIN SODIUM 1 G/3ML
1 INJECTION, POWDER, FOR SOLUTION INTRAMUSCULAR; INTRAVENOUS
Status: DISCONTINUED | OUTPATIENT
Start: 2019-02-26 | End: 2019-02-27

## 2019-02-25 RX ORDER — PROMETHAZINE HYDROCHLORIDE 12.5 MG/1
25 TABLET ORAL EVERY 6 HOURS PRN
Status: DISCONTINUED | OUTPATIENT
Start: 2019-02-25 | End: 2019-03-01 | Stop reason: HOSPADM

## 2019-02-25 RX ORDER — CARVEDILOL 3.12 MG/1
3.12 TABLET ORAL 2 TIMES DAILY
Status: DISCONTINUED | OUTPATIENT
Start: 2019-02-26 | End: 2019-03-01 | Stop reason: HOSPADM

## 2019-02-25 RX ORDER — TAMSULOSIN HYDROCHLORIDE 0.4 MG/1
0.4 CAPSULE ORAL DAILY
Status: DISCONTINUED | OUTPATIENT
Start: 2019-02-26 | End: 2019-03-01 | Stop reason: HOSPADM

## 2019-02-25 RX ORDER — ONDANSETRON 8 MG/1
8 TABLET, ORALLY DISINTEGRATING ORAL EVERY 8 HOURS PRN
Status: DISCONTINUED | OUTPATIENT
Start: 2019-02-25 | End: 2019-03-01 | Stop reason: HOSPADM

## 2019-02-25 RX ADMIN — CEFAZOLIN 1 G: 330 INJECTION, POWDER, FOR SOLUTION INTRAMUSCULAR; INTRAVENOUS at 08:02

## 2019-02-25 NOTE — ED TRIAGE NOTES
Pt brought in from [facility] for a fall that occurred yesterday. Facility told EMS that pt normally gets up and they take him to breakfast and today he was unable to get up do his normal routine. Pt has no complaints but is confused which is normal for pt due to dementia.    LOC/ APPEARANCE: The patient is awake, alert and oriented x self and place. Pt is speaking appropriately, no slurred speech. Patient resting comfortably and in no acute distress.   SKIN:Capillary refill <3 seconds. Pt has moist mucus membranes. Pt has erythema/excoriation noted to bilateral medial thighs. Has erythema to right hip with what appears to be forming ulcer.   MUSCULOSKELETAL: Full range of motion present in all extremities. Hand  equal and leg strength strong bilaterally. Pt uses walker at facility for ambulation assistance.   RESPIRATORY: Airway is open and patent. Respirations are even and non labored. Denies shortness of breath.   CARDIAC: Patient has regular rate and rhythm. No peripheral edema noted in extremities. Patient denies chest pain.  GI: Abdomen is soft and non tender. Normal bowel sounds in all four quadrants.   NEUROLOGIC: Eyes open spontaneously and facial expression symmetrical. Pt behavior appropriate to situation, and pt follows commands.  Pt reports sensation present in all extremities when touched with a finger. PERRLA.  : No complaints of frequency, burning, urgency or blood in the urine. Has some incontinence.

## 2019-02-25 NOTE — PROVIDER PROGRESS NOTES - EMERGENCY DEPT.
Encounter Date: 2/25/2019    ED Physician Progress Notes         EKG - STEMI Decision  Initial Reading: No STEMI present.  Response: No Action Needed.     + ventricularly paced rhythm; No STEMI per NEGATIVE [place] Criteria.  ____________________  Lincoln Bradley MD, [facility]  Emergency Medicine Staff  5:28 PM 2/25/2019

## 2019-02-25 NOTE — ED PROVIDER NOTES
Encounter Date: 2019       History     Chief Complaint   Patient presents with    Fall     Pt fell yesterday morning at [facility] and found on the floor. Pt normally gets up normally and is able to get out of bed. Pt unable to get up this morning.      Patient is a 89-year-old white male with a past medical history of dementia, HTN, CAD, seizures, pacemaker placement who presents the ED via EMS from [facility] following a fall.  History is limited due to dementia. Per EMS, patient was found down by nursing staff yesterday morning. It is unknown how long he was down. They state he was able to get back up and perform the activities of his daily routine for the remainder of the day. This morning, however, the patient refused to get out of bed when he normally readily does so. They deny any obvious injuries. Patient denies pain; however, does report chills.      The history is provided by the patient. The history is limited by the condition of the patient.     Review of patient's allergies indicates:  No Known Allergies  Past Medical History:   Diagnosis Date    Adjustment disorder with depressed mood     Arthritis     Atrioventricular block     Bipolar disorder     Bradycardia     Cancer     Cataract     OU    Coronary artery disease     Dementia     Depression     HEARING LOSS     High cholesterol     Hypertension     Orthostatic hypotension     Seizures      Past Surgical History:   Procedure Laterality Date    ADENOIDECTOMY      CARDIAC PACEMAKER PLACEMENT      COLONOSCOPY      INSERTION-PACEMAKER-DUAL Left 2015    Performed by [facility]iel [facility]illo [facility] at [facility] CATH LAB    TONSILLECTOMY       No family history on file.  Social History     Tobacco Use    Smoking status: Former Smoker     Last attempt to quit: 3/13/1982     Years since quittin.9    Smokeless tobacco: Never Used   Substance Use Topics    Alcohol use: No    Drug use: No     Review of  Systems   Unable to perform ROS: Dementia       Physical Exam     Initial Vitals   BP Pulse Resp Temp SpO2   02/25/19 1537 02/25/19 1537 02/25/19 1537 02/25/19 1547 02/25/19 1537   130/70 72 20 97.9 °F (36.6 °C) 98 %      MAP       --                Physical Exam    Nursing note and vitals reviewed.  Constitutional: He appears well-developed and well-nourished. He is not diaphoretic. No distress.   HENT:   Head: Normocephalic and atraumatic.   Nose: Nose normal.   Mouth/Throat: Oropharynx is clear and moist. No oropharyngeal exudate.   No obvious scalp deformities.   Eyes: Conjunctivae and EOM are normal.   R pupil 3, L pupil 2; round and reactive.  EOMI.   Neck: Normal range of motion. Neck supple.   No midline cervical tenderness.   Cardiovascular: Normal rate, regular rhythm, normal heart sounds and intact distal pulses.   Pulmonary/Chest: No respiratory distress. He has no rhonchi. He has no rales. He exhibits no tenderness.   Diffuse, mild end-expiratory wheezes on exam.   Abdominal: Soft. Bowel sounds are normal. He exhibits no distension and no mass. There is no tenderness. There is no rebound and no guarding.   Musculoskeletal: Normal range of motion. He exhibits no edema or tenderness.   TTP to the right hip, made worse with movement.   FROM and strength of all 4 extremities.   Neurological:   Alert, oriented to person and place only.  Not oriented to year or situation.  No abnormal neurological findings.   Skin:   Diffuse erythema noted to medial thighs.  Stage II decubitus ulcer formation noted to right hip with surrounding cellulitis.   Psychiatric: His speech is normal and behavior is normal. Cognition and memory are impaired. He exhibits abnormal recent memory.         ED Course   Procedures  Labs Reviewed   CBC W/ AUTO DIFFERENTIAL - Abnormal; Notable for the following components:       Result Value    WBC 14.56 (*)     MCH 32.2 (*)     Gran # (ANC) 11.0 (*)     Immature Grans (Abs) 0.06 (*)     Mono  # 1.7 (*)     Gran% 75.2 (*)     Lymph% 12.3 (*)     All other components within normal limits   COMPREHENSIVE METABOLIC PANEL - Abnormal; Notable for the following components:    Glucose 117 (*)     eGFR if non  58.8 (*)     All other components within normal limits   PHENYTOIN LEVEL, TOTAL - Abnormal; Notable for the following components:    Phenytoin Lvl 9.6 (*)     All other components within normal limits   URINALYSIS, REFLEX TO URINE CULTURE - Abnormal; Notable for the following components:    Leukocytes, UA 1+ (*)     All other components within normal limits    Narrative:     Preferred Collection Type->Urine, Clean Catch  GRAY AND YELLOW TOP   URINALYSIS MICROSCOPIC - Abnormal; Notable for the following components:    RBC, UA 6 (*)     WBC, UA 8 (*)     All other components within normal limits    Narrative:     Preferred Collection Type->Urine, Clean Catch  GRAY AND YELLOW TOP   C-REACTIVE PROTEIN - Abnormal; Notable for the following components:    .6 (*)     All other components within normal limits    Narrative:     ADD-ON CPK #[id number] PER ELIA WHITLEY MD 19:51   ADD-ON ESR #[id number] PER ROBIN MIRZA PA-C 20:06  02/25/2019   ADD-ON CRP #[id number] PER ROBIN MIRZA PA-C 20:17  02/25/2019    CULTURE, BLOOD   CULTURE, BLOOD   PROTIME-INR   LACTIC ACID, PLASMA   CK   CK    Narrative:     ADD-ON CPK #[id number] PER ELIA WHITLEY MD 19:51   ADD-ON ESR #[id number] PER ROBIN MIRZA PA-C 20:06  02/25/2019    SEDIMENTATION RATE   C-REACTIVE PROTEIN   SEDIMENTATION RATE    Narrative:     ADD-ON CPK #[id number] PER ELIA WHITLEY MD 19:51   ADD-ON ESR #[id number] PER ROBIN MIRZA PA-C 20:06  02/25/2019    LEVETIRACETAM  (KEPPRA) LEVEL          Imaging Results          X-Ray Hip 2 View Right (Final result)  Result time 02/25/19 21:07:17    Final result by Anmol Leigh MD (02/25/19 21:07:17)                 Impression:      1. No acute displaced fracture or  "dislocation of the right hip.      Electronically signed by: Anmol Leigh MD  Date:    02/25/2019  Time:    21:07             Narrative:    EXAMINATION:  XR HIP 2 VIEW RIGHT    CLINICAL HISTORY:  Pain in unspecified hip    TECHNIQUE:  AP view of the pelvis and frog leg lateral view of the right hip were performed.    COMPARISON:  09/21/2018    FINDINGS:  Two views.    Postsurgical changes are noted of the lower pelvis.  The bilateral femoral heads maintain appropriate relationship with their respective acetabula.  Pubic symphysis is intact.  The sacroiliac joints are intact.                               X-Ray Chest PA And Lateral (Final result)  Result time 02/25/19 19:26:08    Final result by Wyatt Slaughter MD (02/25/19 19:26:08)                 Impression:      No detrimental change when compared with 09/20/2018.    Chronic interstitial changes versus mild interstitial edema.    Focal irregular shaped opacity in the left upper lung field at the inferior aspect of the cardiac pacing device, similar to the prior exam.  Pulmonary neoplasm cannot be excluded.  Suggest follow-up with routine noncontrast chest CT if clinically appropriate.      Electronically signed by: Wyatt Slaughter MD  Date:    02/25/2019  Time:    19:26             Narrative:    EXAMINATION:  XR CHEST PA AND LATERAL    CLINICAL HISTORY:  Provided history is "  Unspecified fall, initial encounter".    TECHNIQUE:  Frontal and lateral views of the chest were performed.    COMPARISON:  09/20/2018.    FINDINGS:  Limited quality portable examination.  Lateral view is essentially nondiagnostic due to rotation and motion.  Left chest wall cardiac pacing device is present with transvenous leads overlying the heart.  Cardiac silhouette is stable.  Atherosclerotic calcifications overlie the aortic arch.  Coarsened interstitial lung markings with possible mild bibasilar airspace disease or chronic interstitial opacities.  Area of increased parenchymal " attenuation noted at the inferior aspect of the left chest wall cardiac pacing device, similar to prior.  Suspect biapical pulmonary emphysema.  This could be followed up with noncontrast CT chest as clinically warranted.  No detrimental change in lung aeration.  Calcified nodular opacity overlies the right thyroid gland.                               CT Head Without Contrast (Final result)  Result time 02/25/19 18:24:00    Final result by Anmol Leigh MD (02/25/19 18:24:00)                 Impression:      1. No acute intracranial abnormalities.  2. Sequela of chronic microvascular ischemic change and senescent change.  3. Sinus disease, mild.      Electronically signed by: Anmol Leigh MD  Date:    02/25/2019  Time:    18:24             Narrative:    EXAMINATION:  CT HEAD WITHOUT CONTRAST    CLINICAL HISTORY:  Confusion/delirium, altered LOC, unexplained;    TECHNIQUE:  Low dose axial images were obtained through the head.  Coronal and sagittal reformations were also performed. Contrast was not administered.    COMPARISON:  09/21/2018    FINDINGS:  There is generalized cerebral volume loss.  There is hypoattenuation in a periventricular fashion, likely sequela of chronic microvascular ischemic change.  There is no evidence of acute major vascular territory infarct, hemorrhage, or mass.  There is no hydrocephalus.  There are no abnormal extra-axial fluid collections.  There is a mucous retention cyst or polyp within the right maxillary sinus, otherwise the visualized paranasal sinuses and mastoid air cells are clear, and there is no evidence of calvarial fracture.  The visualized soft tissues are unremarkable.                                 Medical Decision Making:   Initial Assessment:   89WM with PMHx of HTN, HLD, CAD, dementia and seizures who presents to the ED via EMS for [facility] following a fall yesterday morning. No obvious injuries. PE reveals a non-toxic, afebrile,  elderly-appearing male in no acute distress. Vital signs are stable.   Differential Diagnosis:   DDx includes but is not limited to: mechanical fall, cardiac arrhythmia, seizure, hip fracture, UTI, electrolyte derangement.  Clinical Tests:   Lab Tests: Ordered and Reviewed  Radiological Study: Ordered and Reviewed  Medical Tests: Ordered and Reviewed  ED Management:  Will obtain basic labs, UA, PT-INR, Lactate, Keppra level, Phenytoin level, CPK, X-ray right hip and pelvis, CXR and CT head.    Pertinent lab findings include leukocytosis (14.56) with left shift, normal lytes, Glu 117, normal kidney markers, normal LFTs. Low phenytoin level at 9.6. Lactate negative. CXR without detrimental change; opacity in ERIC field, cannot exclude pulm neoplasm. CT head without acute intracranial abnormalities.     Suspect leukocytosis secondary to cellulitis of the right hip. X-ray hip pending to r/o fracture. Offered patient PO abx for tx of cellulitis, refuses PO. Given his history of chills in setting of cellulitis, I feel IV abx is appropriate.  Blood cultures pending. Mechanism of fall unknown.     X-ray right hip negative for fracture. I feel admission to observation is appropriate for this patient for PT/OT, wound care, and CRP trending. Patient received 1g IV Ancef for treatment of right hip cellulitis. He is stable for admission. I have discussed patient case with my supervisory physician, who is in agreement with my assessment and plan.    STAFF ATTENDING PHYSICIAN NOTE:  I have evaluated and supervised the care of Jesus Beckham, who has a history of dementia and poor historian presenting status post being found on floor by home health aide for evaluation.  Patient with no pain or acute complaints. Exam revealed an awake, alert and cooperative male with no airway respiratory stability, no appreciated trauma to calvarium thorax or extremities. No pelvis instability or active bilateral hip tenderness to palpation or  range of motion deficit.  Patient does have significant erythema, edema, asymmetric warmth and tenderness to palpation to the bilateral superior posterior thighs, gluteus and lower pelvis with stage 1 and minimal portion of skin tear is consistent with stage II ulcers.  We performed imaging and laboratories leading to no appreciated fractures or intracranial hemorrhage, but did appreciate signs and symptoms of cellulitis to sacral decubitus warranting admission to the hospital.  I individually repeated key portions of the MLP's history and physical exam, discussed Jesus Beckham's care with the MLP, reviewed their documentation and agree with their assessment and management of care provided. I was also present for any critical portion of any procedure(s) performed.   ____________________  Lincoln Bradley MD, [facility]  Emergency Medicine Staff                          Clinical Impression:       ICD-10-CM ICD-9-CM   1. Cellulitis of buttock, right L03.317 682.5   2. Fall W19.XXXA E888.9   3. Right hip pain M25.551 719.45         Disposition:   Disposition: Placed in Observation  Condition: Stable        Yi Greene PA-C  02/25/19 9628       Remy Bradley MD  03/01/19 1835

## 2019-02-26 LAB
ALBUMIN SERPL BCP-MCNC: 2.9 G/DL
ALP SERPL-CCNC: 114 U/L
ALT SERPL W/O P-5'-P-CCNC: 10 U/L
ANION GAP SERPL CALC-SCNC: 12 MMOL/L
AST SERPL-CCNC: 14 U/L
BASOPHILS # BLD AUTO: 0.04 K/UL
BASOPHILS NFR BLD: 0.4 %
BILIRUB SERPL-MCNC: 0.5 MG/DL
BUN SERPL-MCNC: 17 MG/DL
CALCIUM SERPL-MCNC: 8.3 MG/DL
CHLORIDE SERPL-SCNC: 110 MMOL/L
CO2 SERPL-SCNC: 20 MMOL/L
CREAT SERPL-MCNC: 0.9 MG/DL
DIFFERENTIAL METHOD: ABNORMAL
EOSINOPHIL # BLD AUTO: 0.2 K/UL
EOSINOPHIL NFR BLD: 2.3 %
ERYTHROCYTE [DISTWIDTH] IN BLOOD BY AUTOMATED COUNT: 13.1 %
EST. GFR  (AFRICAN AMERICAN): >60 ML/MIN/1.73 M^2
EST. GFR  (NON AFRICAN AMERICAN): >60 ML/MIN/1.73 M^2
ESTIMATED AVG GLUCOSE: 111 MG/DL
GLUCOSE SERPL-MCNC: 86 MG/DL
HBA1C MFR BLD HPLC: 5.5 %
HCT VFR BLD AUTO: 39.2 %
HGB BLD-MCNC: 13.1 G/DL
IMM GRANULOCYTES # BLD AUTO: 0.04 K/UL
IMM GRANULOCYTES NFR BLD AUTO: 0.4 %
LYMPHOCYTES # BLD AUTO: 2.6 K/UL
LYMPHOCYTES NFR BLD: 25.3 %
MAGNESIUM SERPL-MCNC: 1.9 MG/DL
MCH RBC QN AUTO: 31.7 PG
MCHC RBC AUTO-ENTMCNC: 33.4 G/DL
MCV RBC AUTO: 95 FL
MONOCYTES # BLD AUTO: 1.4 K/UL
MONOCYTES NFR BLD: 13.6 %
NEUTROPHILS # BLD AUTO: 6 K/UL
NEUTROPHILS NFR BLD: 58 %
NRBC BLD-RTO: 0 /100 WBC
PHENYTOIN SERPL-MCNC: 8.1 UG/ML
PHOSPHATE SERPL-MCNC: 2.9 MG/DL
PLATELET # BLD AUTO: 198 K/UL
PMV BLD AUTO: 10.9 FL
POTASSIUM SERPL-SCNC: 3.7 MMOL/L
PROT SERPL-MCNC: 6.6 G/DL
RBC # BLD AUTO: 4.13 M/UL
SODIUM SERPL-SCNC: 142 MMOL/L
WBC # BLD AUTO: 10.25 K/UL

## 2019-02-26 PROCEDURE — 80053 COMPREHEN METABOLIC PANEL: CPT

## 2019-02-26 PROCEDURE — 83735 ASSAY OF MAGNESIUM: CPT

## 2019-02-26 PROCEDURE — 85025 COMPLETE CBC W/AUTO DIFF WBC: CPT

## 2019-02-26 PROCEDURE — 80185 ASSAY OF PHENYTOIN TOTAL: CPT

## 2019-02-26 PROCEDURE — 83036 HEMOGLOBIN GLYCOSYLATED A1C: CPT

## 2019-02-26 PROCEDURE — 84100 ASSAY OF PHOSPHORUS: CPT

## 2019-02-26 PROCEDURE — 36415 COLL VENOUS BLD VENIPUNCTURE: CPT

## 2019-02-26 PROCEDURE — 25000003 PHARM REV CODE 250: Performed by: PHYSICIAN ASSISTANT

## 2019-02-26 PROCEDURE — G0378 HOSPITAL OBSERVATION PER HR: HCPCS

## 2019-02-26 PROCEDURE — 63600175 PHARM REV CODE 636 W HCPCS: Performed by: PHYSICIAN ASSISTANT

## 2019-02-26 PROCEDURE — 25000003 PHARM REV CODE 250: Performed by: HOSPITALIST

## 2019-02-26 RX ORDER — MICONAZOLE NITRATE 2 %
POWDER (GRAM) TOPICAL 2 TIMES DAILY
Status: DISCONTINUED | OUTPATIENT
Start: 2019-02-26 | End: 2019-03-01 | Stop reason: HOSPADM

## 2019-02-26 RX ADMIN — CEFAZOLIN 1 G: 330 INJECTION, POWDER, FOR SOLUTION INTRAMUSCULAR; INTRAVENOUS at 02:02

## 2019-02-26 RX ADMIN — CARVEDILOL 3.12 MG: 3.12 TABLET, FILM COATED ORAL at 08:02

## 2019-02-26 RX ADMIN — TAMSULOSIN HYDROCHLORIDE 0.4 MG: 0.4 CAPSULE ORAL at 08:02

## 2019-02-26 RX ADMIN — LACTULOSE 10 G: 20 SOLUTION ORAL at 08:02

## 2019-02-26 RX ADMIN — STANDARDIZED SENNA CONCENTRATE AND DOCUSATE SODIUM 1 TABLET: 8.6; 5 TABLET, FILM COATED ORAL at 08:02

## 2019-02-26 RX ADMIN — ATORVASTATIN CALCIUM 40 MG: 20 TABLET, FILM COATED ORAL at 08:02

## 2019-02-26 RX ADMIN — CEFAZOLIN 1 G: 330 INJECTION, POWDER, FOR SOLUTION INTRAMUSCULAR; INTRAVENOUS at 08:02

## 2019-02-26 RX ADMIN — CALCIUM 500 MG: 500 TABLET ORAL at 08:02

## 2019-02-26 RX ADMIN — ACETAMINOPHEN 650 MG: 325 TABLET ORAL at 04:02

## 2019-02-26 RX ADMIN — EXTENDED PHENYTOIN SODIUM 150 MG: 30 CAPSULE ORAL at 11:02

## 2019-02-26 RX ADMIN — CEFAZOLIN 1 G: 330 INJECTION, POWDER, FOR SOLUTION INTRAMUSCULAR; INTRAVENOUS at 04:02

## 2019-02-26 RX ADMIN — DONEPEZIL HYDROCHLORIDE 5 MG: 5 TABLET, FILM COATED ORAL at 08:02

## 2019-02-26 RX ADMIN — LEVETIRACETAM 500 MG: 500 TABLET ORAL at 08:02

## 2019-02-26 RX ADMIN — MICONAZOLE NITRATE: 20 POWDER TOPICAL at 08:02

## 2019-02-26 RX ADMIN — EXTENDED PHENYTOIN SODIUM 150 MG: 30 CAPSULE ORAL at 08:02

## 2019-02-26 RX ADMIN — STANDARDIZED SENNA CONCENTRATE AND DOCUSATE SODIUM 1 TABLET: 8.6; 5 TABLET, FILM COATED ORAL at 11:02

## 2019-02-26 RX ADMIN — THERA TABS 1 TABLET: TAB at 08:02

## 2019-02-26 RX ADMIN — CHLORHEXIDINE GLUCONATE 0.12% ORAL RINSE 15 ML: 1.2 LIQUID ORAL at 08:02

## 2019-02-26 RX ADMIN — OXYCODONE HYDROCHLORIDE AND ACETAMINOPHEN 500 MG: 500 TABLET ORAL at 08:02

## 2019-02-26 RX ADMIN — POTASSIUM CHLORIDE 20 MEQ: 1500 TABLET, EXTENDED RELEASE ORAL at 08:02

## 2019-02-26 NOTE — HPI
Patient is a 89 year old gentleman with a h/o dementia, HTN, CAD, HLD, seizures, complete heart block s/p PPM.  He presents from [facility] after a fall yesterday.  Patient is a poor historian and only oriented to person.  HPI obtained from past medical records.  Per EMS, patient was found down yesterday morning after an unknown period of time by NH staff.  He was able to get back up with assistance and perform his usual ADLs.  This morning, however, patient would not get out of bed, which is out of character for him.  Patient denies pain, trouble breathing.

## 2019-02-26 NOTE — CONSULTS
PharmD Consult received for:  1.) Admit medication history/reconciliation:  · PENDING      Thank you for the consult,    Karli Alston PharmD  [id number]      **Note: Consults are reviewed Monday-Friday 7:00am-3:30pm. The above recommendations are only suggested. The recommendations should be considered in conjunction with all patient factors.**

## 2019-02-26 NOTE — PLAN OF CARE
02/26/19 1259   Discharge Assessment   Assessment Type Discharge Planning Assessment   Assessment information obtained from? Medical Record   Expected Length of Stay (days) 3   Prior to hospitalization functional status: Needs Assistance   Current Functional Status: Needs Assistance   Lives With facility resident   Able to Return to Prior Arrangements yes   Is patient able to care for self after discharge? No   Patient's perception of discharge disposition [facility] care facility   Readmission Within the Last 30 Days no previous admission in last 30 days   Patient currently being followed by outpatient case management? No   Patient currently receives any other outside agency services? No   Equipment Currently Used at Home (per facility)   Do you have any problems affording any of your prescribed medications? No   Is the patient taking medications as prescribed? yes   Does the patient have transportation home? Yes   Transportation Anticipated other (see comments)   Discharge Plan A Return to nursing home   Discharge Plan B Skilled Nursing Facility   Plan to return to [facility] when medically ready.

## 2019-02-26 NOTE — CONSULTS
Wound care consult for red, excoriated scrotum and perineum. Pt was sent from [facility] after at fall at the facility. Chart reflects that pt has a hx of seizure disorder, HTN, dementia, and CAD.    Upon assessment of scrotum and perineum: red, intact inflamed skin fungal in appearence without any drainage noted.    Pt's bedside nurse states that she thinks the areas of redness may have occurred due to moisture related skin irritation from being in an adult brief.    Pt awake and alert without any complaints of pain or burning voiced.    Recommendations:  -Nursing to cleanse perineum, buttocks, bilateral inner thighs, and groin with cleansing cloths and apply antifungal powder or ointment BID and PRN cleaning. Critic Aid moisture barrier (PUPRLE TOP) may be used in conjunction with antifungal treatment as needed.  -Nursing to continue pressure injury prevention interventions to include repositioning with wedge pillow, heel protectors and waffle cushion overlay.    Discussed  Plan of care with pt, pt's nurse, and with Dr. Vasquez.    No open areas noted; wound care to sign off at this time. Please consult wound care if further assistance is needed.        Perineum/Buttocks        Penis and inner thighs

## 2019-02-26 NOTE — NURSING
Patient has arrived on the unit via stretcher from the ED. Report received at 5366 from Yi. Patient is very drowsy at this time, but not in distress.

## 2019-02-26 NOTE — ASSESSMENT & PLAN NOTE
- Continue Aricept 5mg qHS.  - Patient oriented to person only.    DELIRIUM BEHAVIOR MANAGEMENT  - Minimize use of restraints; if physical restraints necessary, please utilize medical/chemical prns for agitation.  - Keep shades open and room lit during day and room dim at night in order to promote healthy circadian rhythms.  - Encourage family at bedside  - Keep whiteboard in patient's room current with the date and name of the members of patient's team for easy patient self re-orientation.  - Avoid benzodiazepines, antihistamines, anticholinergics, hypnotics, and minimize opiates while controlling for pain as these medications may exacerbate delirium.

## 2019-02-26 NOTE — CARE UPDATE
"1:44 PM    Patient seen and examined during am rounds. For full H&P, please see OMEGA Dawkins' H&P from early this morning.     Briefly, this is a 89 yo M w/ seizure disorder, HTN, DLD, dementia, CAD who was sent in from [facility] after a fall at the facility. Patient is pleasantly demented, oriented to self, location but not to time (2018). He denies complaints but believes he was sent here "because I fell". Does not recall events surrounding falls. Denies SOB, chest pain, dizziness, palpitations. ED workup remarkable for mild leukocytosis (WBC 14.5), elevated inflammatory markers (), intertrigal, groin and buttock erythema on physical examination. Cultures drawn on admission pending.     Etiology of fall unclear, given limited history. Suspect this may be due to age-related debility and dementia as opposed to acute process, however will rule out infection, cardiac etiology. Continue abx for now, follow up cultures, de-escalate as appropriate, monitor on tele for arrhythmias, and follow up PT/OT. Anticipated discharge 1-2 days.   "

## 2019-02-26 NOTE — SUBJECTIVE & OBJECTIVE
Past Medical History:   Diagnosis Date    Adjustment disorder with depressed mood     Arthritis     Atrioventricular block     Bipolar disorder     Bradycardia     Cancer     Cataract     OU    Coronary artery disease     Dementia     Depression     HEARING LOSS     High cholesterol     Hypertension     Orthostatic hypotension     Seizures        Past Surgical History:   Procedure Laterality Date    ADENOIDECTOMY      CARDIAC PACEMAKER PLACEMENT      COLONOSCOPY      INSERTION-PACEMAKER-DUAL Left 12/28/2015    Performed by [facility] at [facility] CATH LAB    TONSILLECTOMY         Review of patient's allergies indicates:  No Known Allergies    No current facility-administered medications on file prior to encounter.      Current Outpatient Medications on File Prior to Encounter   Medication Sig    acetaminophen (TYLENOL) 500 MG tablet Take 500 mg by mouth every 6 (six) hours as needed for Pain.     ascorbic acid (VITAMIN C) 500 MG tablet Take 500 mg by mouth every evening.    atorvastatin (LIPITOR) 40 MG tablet Take 40 mg by mouth every evening.    bisacodyl (DULCOLAX, BISACODYL,) 10 mg Supp Place 10 mg rectally daily as needed (constipation).     CALCIUM CARBONATE (CALCIUM 600 ORAL) Take 2 tablets by mouth once daily.    carvedilol (COREG) 3.125 MG tablet Take 3.125 mg by mouth 2 (two) times daily.    chlorhexidine (PERIDEX) 0.12 % solution Swish and spit every evening    clotrimazole-betamethasone 1-0.05% (LOTRISONE) cream Apply to facial rash twice daily    donepezil (ARICEPT) 5 MG tablet Take 5 mg by mouth every evening.    lactulose (CHRONULAC) 10 gram/15 mL solution Take 10 g by mouth once daily.    levetiracetam (KEPPRA) 500 MG Tab Take 1 tablet (500 mg total) by mouth 2 (two) times daily. 1 Tablet Oral Twice a day (Patient taking differently: Take 500 mg by mouth 2 (two) times daily. )    linaclotide (LINZESS) 72 mcg Cap Take 1 tablet by mouth once daily    multivitamin  (THERAGRAN) per tablet Take 1 tablet by mouth once daily.    phenytoin (DILANTIN) 30 MG ER capsule Take 5 capsules (150 mg total) by mouth 2 (two) times daily. Dose decreased on 2018.  Please draw phenytoin level 30 minutes PRIOR to the AM dose on 10/01/2018 and adjust dose accordingly. Goal total phenytoin level adjusted for albumin 10-20 mg/L.    potassium chloride SA (K-DUR,KLOR-CON) 20 MEQ tablet Take 20 mEq by mouth every evening.    tamsulosin (FLOMAX) 0.4 mg Cp24 Take 1 capsule (0.4 mg total) by mouth once daily.     Family History     None        Tobacco Use    Smoking status: Former Smoker     Last attempt to quit: 3/13/1982     Years since quittin.9    Smokeless tobacco: Never Used   Substance and Sexual Activity    Alcohol use: No    Drug use: No    Sexual activity: No     Review of Systems   Unable to perform ROS: Dementia     Objective:     Vital Signs (Most Recent):  Temp: 97.5 °F (36.4 °C) (19)  Pulse: 70 (19)  Resp: 14 (19)  BP: (!) 124/55 (19)  SpO2: 98 % (19) Vital Signs (24h Range):  Temp:  [97.5 °F (36.4 °C)-98.3 °F (36.8 °C)] 97.5 °F (36.4 °C)  Pulse:  [70-75] 70  Resp:  [14-20] 14  SpO2:  [97 %-100 %] 98 %  BP: (114-140)/(55-70) 124/55     Weight: 73 kg (161 lb)  Body mass index is 25.22 kg/m².    Physical Exam   Constitutional: He appears well-developed and well-nourished. No distress.   HENT:   Head: Normocephalic and atraumatic.   Eyes: Pupils are equal, round, and reactive to light.   Neck: Neck supple. Carotid bruit is not present. No thyromegaly present.   Cardiovascular: Normal rate and regular rhythm. Exam reveals no gallop.   No murmur heard.  Pulmonary/Chest: Effort normal and breath sounds normal. No respiratory distress. He has no wheezes.   Abdominal: Bowel sounds are normal. He exhibits no distension. There is no splenomegaly or hepatomegaly. There is no tenderness.   Musculoskeletal: Normal range of motion.  He exhibits no edema.   Neurological: He is alert. No cranial nerve deficit or sensory deficit. GCS eye subscore is 4. GCS verbal subscore is 5. GCS motor subscore is 6.   Oriented to person only   Skin: Skin is warm and dry. No rash noted.   Diffuse erythema noted to medial thighs.  Stage II decubitus ulcer formation noted to right hip with surrounding cellulitis.    Psychiatric: He has a normal mood and affect. His behavior is normal.         CRANIAL NERVES     CN III, IV, VI   Pupils are equal, round, and reactive to light.       Significant Labs:   CBC:   Recent Labs   Lab 02/25/19  1706   WBC 14.56*   HGB 15.0   HCT 44.5        CMP:   Recent Labs   Lab 02/25/19  1706      K 3.8      CO2 24   *   BUN 18   CREATININE 1.1   CALCIUM 9.2   PROT 7.9   ALBUMIN 3.5   BILITOT 0.5   ALKPHOS 133   AST 18   ALT 14   ANIONGAP 11   EGFRNONAA 58.8*     Cardiac Markers: No results for input(s): CKMB, MYOGLOBIN, BNP, TROPISTAT in the last 48 hours.  Troponin: No results for input(s): TROPONINI in the last 48 hours.  Urine Studies:   Recent Labs   Lab 02/25/19  1904   COLORU Yellow   APPEARANCEUA Clear   PHUR 5.0   SPECGRAV 1.020   PROTEINUA Negative   GLUCUA Negative   KETONESU Negative   BILIRUBINUA Negative   OCCULTUA Negative   NITRITE Negative   LEUKOCYTESUR 1+*   RBCUA 6*   WBCUA 8*   SQUAMEPITHEL 1       Significant Imaging: I have reviewed all pertinent imaging results/findings within the past 24 hours.

## 2019-02-26 NOTE — H&P
[facility] Hospital Medicine  History & Physical    Patient Name: Jesus Beckham  MRN: [id number]  Admission Date: 2/25/2019  Attending Physician: Linda Meléndez MD   Primary Care Provider: Tulio Tovar MD    [facility] Medicine Team: [facility] MED E Laura Dawkins PA-C     Patient information was obtained from patient, past medical records and ER records.     Subjective:     Principal Problem:Cellulitis of buttock, right    Chief Complaint:   Chief Complaint   Patient presents with    Fall     Pt fell yesterday morning at [facility] and found on the floor. Pt normally gets up normally and is able to get out of bed. Pt unable to get up this morning.         HPI: Patient is a 89 year old gentleman with a h/o dementia, HTN, CAD, HLD, seizures, complete heart block s/p PPM.  He presents from [facility] after a fall yesterday.  Patient is a poor historian and only oriented to person.  HPI obtained from past medical records.  Per EMS, patient was found down yesterday morning after an unknown period of time by NH staff.  He was able to get back up with assistance and perform his usual ADLs.  This morning, however, patient would not get out of bed, which is out of character for him.  Patient denies pain, trouble breathing.      Past Medical History:   Diagnosis Date    Adjustment disorder with depressed mood     Arthritis     Atrioventricular block     Bipolar disorder     Bradycardia     Cancer     Cataract     OU    Coronary artery disease     Dementia     Depression     HEARING LOSS     High cholesterol     Hypertension     Orthostatic hypotension     Seizures        Past Surgical History:   Procedure Laterality Date    ADENOIDECTOMY      CARDIAC PACEMAKER PLACEMENT      COLONOSCOPY      INSERTION-PACEMAKER-DUAL Left 12/28/2015    Performed by [facility]equiel [facility]illo [facility] at [facility] CATH LAB    TONSILLECTOMY         Review of patient's allergies indicates:  No  Known Allergies    No current facility-administered medications on file prior to encounter.      Current Outpatient Medications on File Prior to Encounter   Medication Sig    acetaminophen (TYLENOL) 500 MG tablet Take 500 mg by mouth every 6 (six) hours as needed for Pain.     ascorbic acid (VITAMIN C) 500 MG tablet Take 500 mg by mouth every evening.    atorvastatin (LIPITOR) 40 MG tablet Take 40 mg by mouth every evening.    bisacodyl (DULCOLAX, BISACODYL,) 10 mg Supp Place 10 mg rectally daily as needed (constipation).     CALCIUM CARBONATE (CALCIUM 600 ORAL) Take 2 tablets by mouth once daily.    carvedilol (COREG) 3.125 MG tablet Take 3.125 mg by mouth 2 (two) times daily.    chlorhexidine (PERIDEX) 0.12 % solution Swish and spit every evening    clotrimazole-betamethasone 1-0.05% (LOTRISONE) cream Apply to facial rash twice daily    donepezil (ARICEPT) 5 MG tablet Take 5 mg by mouth every evening.    lactulose (CHRONULAC) 10 gram/15 mL solution Take 10 g by mouth once daily.    levetiracetam (KEPPRA) 500 MG Tab Take 1 tablet (500 mg total) by mouth 2 (two) times daily. 1 Tablet Oral Twice a day (Patient taking differently: Take 500 mg by mouth 2 (two) times daily. )    linaclotide (LINZESS) 72 mcg Cap Take 1 tablet by mouth once daily    multivitamin (THERAGRAN) per tablet Take 1 tablet by mouth once daily.    phenytoin (DILANTIN) 30 MG ER capsule Take 5 capsules (150 mg total) by mouth 2 (two) times daily. Dose decreased on 09/23/2018.  Please draw phenytoin level 30 minutes PRIOR to the AM dose on 10/01/2018 and adjust dose accordingly. Goal total phenytoin level adjusted for albumin 10-20 mg/L.    potassium chloride SA (K-DUR,KLOR-CON) 20 MEQ tablet Take 20 mEq by mouth every evening.    tamsulosin (FLOMAX) 0.4 mg Cp24 Take 1 capsule (0.4 mg total) by mouth once daily.     Family History     None        Tobacco Use    Smoking status: Former Smoker     Last attempt to quit: 3/13/1982      Years since quittin.9    Smokeless tobacco: Never Used   Substance and Sexual Activity    Alcohol use: No    Drug use: No    Sexual activity: No     Review of Systems   Unable to perform ROS: Dementia     Objective:     Vital Signs (Most Recent):  Temp: 97.5 °F (36.4 °C) (19)  Pulse: 70 (19)  Resp: 14 (19)  BP: (!) 124/55 (19)  SpO2: 98 % (19) Vital Signs (24h Range):  Temp:  [97.5 °F (36.4 °C)-98.3 °F (36.8 °C)] 97.5 °F (36.4 °C)  Pulse:  [70-75] 70  Resp:  [14-20] 14  SpO2:  [97 %-100 %] 98 %  BP: (114-140)/(55-70) 124/55     Weight: 73 kg (161 lb)  Body mass index is 25.22 kg/m².    Physical Exam   Constitutional: He appears well-developed and well-nourished. No distress.   HENT:   Head: Normocephalic and atraumatic.   Eyes: Pupils are equal, round, and reactive to light.   Neck: Neck supple. Carotid bruit is not present. No thyromegaly present.   Cardiovascular: Normal rate and regular rhythm. Exam reveals no gallop.   No murmur heard.  Pulmonary/Chest: Effort normal and breath sounds normal. No respiratory distress. He has no wheezes.   Abdominal: Bowel sounds are normal. He exhibits no distension. There is no splenomegaly or hepatomegaly. There is no tenderness.   Musculoskeletal: Normal range of motion. He exhibits no edema.   Neurological: He is alert. No cranial nerve deficit or sensory deficit. GCS eye subscore is 4. GCS verbal subscore is 5. GCS motor subscore is 6.   Oriented to person only   Skin: Skin is warm and dry. No rash noted.   Diffuse erythema noted to medial thighs.  Stage II decubitus ulcer formation noted to right hip with surrounding cellulitis.    Psychiatric: He has a normal mood and affect. His behavior is normal.         CRANIAL NERVES     CN III, IV, VI   Pupils are equal, round, and reactive to light.       Significant Labs:   CBC:   Recent Labs   Lab 02/25/19  1706   WBC 14.56*   HGB 15.0   HCT 44.5        CMP:    Recent Labs   Lab 02/25/19  1706      K 3.8      CO2 24   *   BUN 18   CREATININE 1.1   CALCIUM 9.2   PROT 7.9   ALBUMIN 3.5   BILITOT 0.5   ALKPHOS 133   AST 18   ALT 14   ANIONGAP 11   EGFRNONAA 58.8*     Cardiac Markers: No results for input(s): CKMB, MYOGLOBIN, BNP, TROPISTAT in the last 48 hours.  Troponin: No results for input(s): TROPONINI in the last 48 hours.  Urine Studies:   Recent Labs   Lab 02/25/19  1904   COLORU Yellow   APPEARANCEUA Clear   PHUR 5.0   SPECGRAV 1.020   PROTEINUA Negative   GLUCUA Negative   KETONESU Negative   BILIRUBINUA Negative   OCCULTUA Negative   NITRITE Negative   LEUKOCYTESUR 1+*   RBCUA 6*   WBCUA 8*   SQUAMEPITHEL 1       Significant Imaging: I have reviewed all pertinent imaging results/findings within the past 24 hours.    Assessment/Plan:     * Cellulitis of buttock, right    Fall    - CT head with no acute abnormalities.  - CXR showed chronic interstitial changes versus mild interstitial edema.  Focal irregular shaped opacity in the left upper lung field at the inferior aspect of the cardiac pacing device, similar to the prior exam.  Unable to exclude pulmonary neoplasm cannot be excluded.  Radiology suggests follow-up with routine noncontrast chest CT if clinically appropriate.  - Right hip x-ray showed no evidence of fracture or dislocation.  - WBC 14.56, ESR 10, .6, CPK 75, lactic acid 1.2.  - Concern for cellulitis of right hip, blood cultures pending.  Patient refused PO antibiotics in the ED and was started on IV Ancef.  Will continue.  - Will consult PT, SW. Fall precautions, aspiration precautions.     Cardiac pacemaker in situ    H/o Complete heart block    - Stable.  Continue current therapy.     Coronary artery disease involving native coronary artery of native heart without angina pectoris    - Continue secondary prevention.       Dementia    - Continue Aricept 5mg qHS.  - Patient oriented to person only.    DELIRIUM BEHAVIOR  MANAGEMENT  - Minimize use of restraints; if physical restraints necessary, please utilize medical/chemical prns for agitation.  - Keep shades open and room lit during day and room dim at night in order to promote healthy circadian rhythms.  - Encourage family at bedside  - Keep whiteboard in patient's room current with the date and name of the members of patient's team for easy patient self re-orientation.  - Avoid benzodiazepines, antihistamines, anticholinergics, hypnotics, and minimize opiates while controlling for pain as these medications may exacerbate delirium.       Seizure disorder    - Continue Keppra 500mg BID and Dilantin 150mg BID.  - Will check levels.       Dyslipidemia    - Continue Lipitor 40mg qHS.       Essential hypertension    - Continue Coreg 3.125mg BID.         VTE Risk Mitigation (From admission, onward)        Ordered     Place sequential compression device  Until discontinued      02/25/19 2258     Place ELIJAH hose  Until discontinued      02/25/19 2258     IP VTE HIGH RISK PATIENT  Once      02/25/19 2258             Laura Dawkins PA-C  Department of Hospital Medicine   [facility]

## 2019-02-26 NOTE — ASSESSMENT & PLAN NOTE
Fall    - CT head with no acute abnormalities.  - CXR showed chronic interstitial changes versus mild interstitial edema.  Focal irregular shaped opacity in the left upper lung field at the inferior aspect of the cardiac pacing device, similar to the prior exam.  Unable to exclude pulmonary neoplasm cannot be excluded.  Radiology suggests follow-up with routine noncontrast chest CT if clinically appropriate.  - Right hip x-ray showed no evidence of fracture or dislocation.  - WBC 14.56, ESR 10, .6, CPK 75, lactic acid 1.2.  - Concern for cellulitis of right hip, blood cultures pending.  Patient refused PO antibiotics in the ED and was started on IV Ancef.  Will continue.  - Will consult PT, SW. Fall precautions, aspiration precautions.

## 2019-02-26 NOTE — ED NOTES
Pt states he is cold, given another blanket. Updated on bed status. Has no other needs. NAD noted.

## 2019-02-26 NOTE — PLAN OF CARE
"Problem: Fall Injury Risk  Goal: Absence of Fall and Fall-Related Injury    Intervention: Identify and Manage Contributors to Fall Injury Risk  Patient has been educated on his risk for falls; however, he did get up without requesting assistance. I have asked him again to please not get up without staff assistance, to please call before he gets up. To this he replied, "Okay, I will." I have again showed him how to use the call light. He has drinking water, his urinal and the call light within reach. I explained that it was very important that he call before getting up because one of the reasons he was admitted was for falling. He again verbalized understanding. Bed down in lowest position, call light within reach, side rails up x2, bed alarm on.        "

## 2019-02-26 NOTE — ED NOTES
Pt taken to the bathroom with 2 assist. Pt did well. Put back in bed with rails up. Has no other needs.

## 2019-02-27 LAB
ALBUMIN SERPL BCP-MCNC: 2.7 G/DL
ALP SERPL-CCNC: 100 U/L
ALT SERPL W/O P-5'-P-CCNC: 9 U/L
ANION GAP SERPL CALC-SCNC: 8 MMOL/L
AST SERPL-CCNC: 19 U/L
BASOPHILS # BLD AUTO: 0.04 K/UL
BASOPHILS NFR BLD: 0.5 %
BILIRUB SERPL-MCNC: 0.3 MG/DL
BUN SERPL-MCNC: 13 MG/DL
CALCIUM SERPL-MCNC: 8.2 MG/DL
CHLORIDE SERPL-SCNC: 109 MMOL/L
CO2 SERPL-SCNC: 22 MMOL/L
CREAT SERPL-MCNC: 0.8 MG/DL
DIFFERENTIAL METHOD: ABNORMAL
EOSINOPHIL # BLD AUTO: 0.4 K/UL
EOSINOPHIL NFR BLD: 4.6 %
ERYTHROCYTE [DISTWIDTH] IN BLOOD BY AUTOMATED COUNT: 13.2 %
EST. GFR  (AFRICAN AMERICAN): >60 ML/MIN/1.73 M^2
EST. GFR  (NON AFRICAN AMERICAN): >60 ML/MIN/1.73 M^2
GLUCOSE SERPL-MCNC: 83 MG/DL
HCT VFR BLD AUTO: 36.9 %
HGB BLD-MCNC: 12.2 G/DL
IMM GRANULOCYTES # BLD AUTO: 0.01 K/UL
IMM GRANULOCYTES NFR BLD AUTO: 0.1 %
LEVETIRACETAM SERPL-MCNC: 14.8 UG/ML (ref 3–60)
LYMPHOCYTES # BLD AUTO: 2.3 K/UL
LYMPHOCYTES NFR BLD: 30.3 %
MCH RBC QN AUTO: 31.7 PG
MCHC RBC AUTO-ENTMCNC: 33.1 G/DL
MCV RBC AUTO: 96 FL
MONOCYTES # BLD AUTO: 1 K/UL
MONOCYTES NFR BLD: 13.7 %
NEUTROPHILS # BLD AUTO: 3.8 K/UL
NEUTROPHILS NFR BLD: 50.8 %
NRBC BLD-RTO: 0 /100 WBC
PHENYTOIN SERPL-MCNC: 7.2 UG/ML
PLATELET # BLD AUTO: 204 K/UL
PMV BLD AUTO: 11 FL
POTASSIUM SERPL-SCNC: 4 MMOL/L
PROT SERPL-MCNC: 6.3 G/DL
RBC # BLD AUTO: 3.85 M/UL
SODIUM SERPL-SCNC: 139 MMOL/L
WBC # BLD AUTO: 7.53 K/UL

## 2019-02-27 PROCEDURE — 97161 PT EVAL LOW COMPLEX 20 MIN: CPT

## 2019-02-27 PROCEDURE — 25000003 PHARM REV CODE 250: Performed by: HOSPITALIST

## 2019-02-27 PROCEDURE — 25000003 PHARM REV CODE 250: Performed by: PHYSICIAN ASSISTANT

## 2019-02-27 PROCEDURE — 97530 THERAPEUTIC ACTIVITIES: CPT

## 2019-02-27 PROCEDURE — 80185 ASSAY OF PHENYTOIN TOTAL: CPT

## 2019-02-27 PROCEDURE — 99225 PR SUBSEQUENT OBSERVATION CARE,LEVEL II: ICD-10-PCS | Mod: ,,, | Performed by: HOSPITALIST

## 2019-02-27 PROCEDURE — 85025 COMPLETE CBC W/AUTO DIFF WBC: CPT

## 2019-02-27 PROCEDURE — G0378 HOSPITAL OBSERVATION PER HR: HCPCS

## 2019-02-27 PROCEDURE — 63600175 PHARM REV CODE 636 W HCPCS: Performed by: PHYSICIAN ASSISTANT

## 2019-02-27 PROCEDURE — 36415 COLL VENOUS BLD VENIPUNCTURE: CPT

## 2019-02-27 PROCEDURE — 80053 COMPREHEN METABOLIC PANEL: CPT

## 2019-02-27 PROCEDURE — 99225 PR SUBSEQUENT OBSERVATION CARE,LEVEL II: CPT | Mod: ,,, | Performed by: HOSPITALIST

## 2019-02-27 RX ORDER — CLINDAMYCIN HYDROCHLORIDE 150 MG/1
300 CAPSULE ORAL EVERY 6 HOURS
Status: DISCONTINUED | OUTPATIENT
Start: 2019-02-27 | End: 2019-03-01 | Stop reason: HOSPADM

## 2019-02-27 RX ADMIN — MICONAZOLE NITRATE: 20 POWDER TOPICAL at 10:02

## 2019-02-27 RX ADMIN — CARVEDILOL 3.12 MG: 3.12 TABLET, FILM COATED ORAL at 10:02

## 2019-02-27 RX ADMIN — CEFAZOLIN 1 G: 330 INJECTION, POWDER, FOR SOLUTION INTRAMUSCULAR; INTRAVENOUS at 01:02

## 2019-02-27 RX ADMIN — TAMSULOSIN HYDROCHLORIDE 0.4 MG: 0.4 CAPSULE ORAL at 09:02

## 2019-02-27 RX ADMIN — THERA TABS 1 TABLET: TAB at 09:02

## 2019-02-27 RX ADMIN — STANDARDIZED SENNA CONCENTRATE AND DOCUSATE SODIUM 1 TABLET: 8.6; 5 TABLET, FILM COATED ORAL at 09:02

## 2019-02-27 RX ADMIN — EXTENDED PHENYTOIN SODIUM 150 MG: 30 CAPSULE ORAL at 10:02

## 2019-02-27 RX ADMIN — CARVEDILOL 3.12 MG: 3.12 TABLET, FILM COATED ORAL at 09:02

## 2019-02-27 RX ADMIN — MICONAZOLE NITRATE: 20 POWDER TOPICAL at 09:02

## 2019-02-27 RX ADMIN — CALCIUM 500 MG: 500 TABLET ORAL at 09:02

## 2019-02-27 RX ADMIN — OXYCODONE HYDROCHLORIDE AND ACETAMINOPHEN 500 MG: 500 TABLET ORAL at 09:02

## 2019-02-27 RX ADMIN — LEVETIRACETAM 500 MG: 500 TABLET ORAL at 09:02

## 2019-02-27 RX ADMIN — LACTULOSE 10 G: 20 SOLUTION ORAL at 09:02

## 2019-02-27 RX ADMIN — EXTENDED PHENYTOIN SODIUM 150 MG: 30 CAPSULE ORAL at 09:02

## 2019-02-27 RX ADMIN — CLINDAMYCIN HYDROCHLORIDE 300 MG: 150 CAPSULE ORAL at 10:02

## 2019-02-27 RX ADMIN — POTASSIUM CHLORIDE 20 MEQ: 1500 TABLET, EXTENDED RELEASE ORAL at 10:02

## 2019-02-27 RX ADMIN — ATORVASTATIN CALCIUM 40 MG: 20 TABLET, FILM COATED ORAL at 09:02

## 2019-02-27 RX ADMIN — CEFAZOLIN 1 G: 330 INJECTION, POWDER, FOR SOLUTION INTRAMUSCULAR; INTRAVENOUS at 05:02

## 2019-02-27 RX ADMIN — DONEPEZIL HYDROCHLORIDE 5 MG: 5 TABLET, FILM COATED ORAL at 10:02

## 2019-02-27 NOTE — ASSESSMENT & PLAN NOTE
- Continue Keppra 500mg BID and Dilantin 150mg BID.  - Dilantin levels are subtherapeutic. On chart review, dose was recently decreased due to supratherapeutic levels previously. Unclear regarding whether patient received this medication as scheduled prior to arrival. Will recheck level in am and adjust dosage if he remains subtherapeutic. Recommended goal range 10-20.   - Continue seizure precautions.

## 2019-02-27 NOTE — PLAN OF CARE
Problem: Physical Therapy Goal  Goal: Physical Therapy Goal  Outcome: Outcome(s) achieved Date Met: 02/27/19  Patient at this time is at their functional baseline and does not require skilled acute PT services at this time. Please re consult PT if pt has change in functional status.       Manuel Gomez PT, DPT  2/27/2019  Pager: [phone]

## 2019-02-27 NOTE — PROGRESS NOTES
[facility] Hospital Medicine  Progress Note    Patient Name: Jesus Beckham  MRN: [id number]  Patient Class: OP- Observation   Admission Date: 2/25/2019  Length of Stay: 0 days  Attending Physician: Katelynn Vasquez*  Primary Care Provider: Tulio Tovar MD    [facility] Medicine Team: [facility] R Katelynn Vasquez MD    Subjective:     Principal Problem:Cellulitis of buttock, right    HPI:  Patient is a 89 year old gentleman with a h/o dementia, HTN, CAD, HLD, seizures, complete heart block s/p PPM.  He presents from [facility] after a fall yesterday.  Patient is a poor historian and only oriented to person.  HPI obtained from past medical records.  Per EMS, patient was found down yesterday morning after an unknown period of time by NH staff.  He was able to get back up with assistance and perform his usual ADLs.  This morning, however, patient would not get out of bed, which is out of character for him.  Patient denies pain, trouble breathing.      Hospital Course:  No notes on file    Interval History: NAEON. No complaints this am. Doing well. Agreeable to discharge in am.     Review of Systems   Constitutional: Negative for chills and fever.   Respiratory: Negative for shortness of breath.    Cardiovascular: Negative for chest pain.   Gastrointestinal: Negative for constipation, diarrhea, nausea and vomiting.   Genitourinary: Negative for difficulty urinating and dysuria.   Musculoskeletal: Negative for neck pain and neck stiffness.   Skin: Positive for rash (groin, buttock).   Neurological: Negative for dizziness, light-headedness and headaches.   Psychiatric/Behavioral: Negative for agitation, behavioral problems and confusion.     Objective:     Vital Signs (Most Recent):  Temp: 98 °F (36.7 °C) (02/27/19 1131)  Pulse: 61 (02/27/19 1131)  Resp: 17 (02/27/19 1131)  BP: 130/60 (02/27/19 1131)  SpO2: (!) 94 % (02/27/19 1131) Vital Signs (24h Range):  Temp:  [96.3 °F  (35.7 °C)-98 °F (36.7 °C)] 98 °F (36.7 °C)  Pulse:  [60-66] 61  Resp:  [16-18] 17  SpO2:  [94 %-97 %] 94 %  BP: (117-142)/(58-65) 130/60     Weight: 73.2 kg (161 lb 6 oz)  Body mass index is 25.28 kg/m².    Intake/Output Summary (Last 24 hours) at 2/27/2019 1402  Last data filed at 2/27/2019 0936  Gross per 24 hour   Intake 700 ml   Output --   Net 700 ml      Physical Exam   Constitutional: He appears well-developed and well-nourished. No distress.   HENT:   Head: Normocephalic and atraumatic.   Eyes: Pupils are equal, round, and reactive to light.   Neck: Neck supple. Carotid bruit is not present. No thyromegaly present.   Cardiovascular: Normal rate and regular rhythm. Exam reveals no gallop.   No murmur heard.  Pulmonary/Chest: Effort normal and breath sounds normal. No respiratory distress. He has no wheezes.   Abdominal: Bowel sounds are normal. He exhibits no distension. There is no splenomegaly or hepatomegaly. There is no tenderness.   Musculoskeletal: Normal range of motion. He exhibits no edema.   Neurological: He is alert. No cranial nerve deficit or sensory deficit. GCS eye subscore is 4. GCS verbal subscore is 5. GCS motor subscore is 6.   Oriented to person, place, situation.   Skin: Skin is warm and dry. No rash noted.   Diffuse erythema noted in groin, buttock. Some skin sloughing. No ulceration, exudate, induration or fluctuance noted.   Psychiatric: He has a normal mood and affect. His behavior is normal.       Significant Labs:   CBC:   Recent Labs   Lab 02/25/19  1706 02/26/19  0411 02/27/19  0330   WBC 14.56* 10.25 7.53   HGB 15.0 13.1* 12.2*   HCT 44.5 39.2* 36.9*    198 204     CMP:   Recent Labs   Lab 02/25/19  1706 02/26/19  0411 02/27/19  0330    142 139   K 3.8 3.7 4.0    110 109   CO2 24 20* 22*   * 86 83   BUN 18 17 13   CREATININE 1.1 0.9 0.8   CALCIUM 9.2 8.3* 8.2*   PROT 7.9 6.6 6.3   ALBUMIN 3.5 2.9* 2.7*   BILITOT 0.5 0.5 0.3   ALKPHOS 133 114 100   AST 18  14 19   ALT 14 10 9*   ANIONGAP 11 12 8   EGFRNONAA 58.8* >60.0 >60.0       Significant Imaging: I have reviewed and interpreted all pertinent imaging results/findings within the past 24 hours.    Assessment/Plan:      * Cellulitis of buttock, right    - CT head with no acute abnormalities.  - CXR showed chronic interstitial changes versus mild interstitial edema.  Focal irregular shaped opacity in the left upper lung field at the inferior aspect of the cardiac pacing device, similar to the prior exam.  Unable to exclude pulmonary neoplasm cannot be excluded.  Radiology suggests follow-up with routine noncontrast chest CT if clinically appropriate.  - Right hip x-ray showed no evidence of fracture or dislocation.  - WBC 14.56, ESR 10, .6, CPK 75, lactic acid 1.2.  - Concern for cellulitis vs candidal intertrigo. Blood cultures pending (NGTD).  Patient refused PO antibiotics in the ED and was started on IV Ancef.  Will transition to PO clindamycin today in anticipation for discharge in am.   - Will consult PT, SW. Fall precautions, seizure, aspiration precautions.     Essential hypertension    - Continue Coreg 3.125mg BID.       Cardiac pacemaker in situ    H/o Complete heart block    - Stable.  Continue current therapy.     Coronary artery disease involving native coronary artery of native heart without angina pectoris    - Continue secondary prevention.       Dementia    - Continue Aricept 5mg qHS.  - Patient oriented to person only.    DELIRIUM BEHAVIOR MANAGEMENT  - Minimize use of restraints; if physical restraints necessary, please utilize medical/chemical prns for agitation.  - Keep shades open and room lit during day and room dim at night in order to promote healthy circadian rhythms.  - Encourage family at bedside  - Keep whiteboard in patient's room current with the date and name of the members of patient's team for easy patient self re-orientation.  - Avoid benzodiazepines, antihistamines,  anticholinergics, hypnotics, and minimize opiates while controlling for pain as these medications may exacerbate delirium.       Seizure disorder    - Continue Keppra 500mg BID and Dilantin 150mg BID.  - Dilantin levels are subtherapeutic. On chart review, dose was recently decreased due to supratherapeutic levels previously. Unclear regarding whether patient received this medication as scheduled prior to arrival. Will recheck level in am and adjust dosage if he remains subtherapeutic. Recommended goal range 10-20.   - Continue seizure precautions.        Dyslipidemia    - Continue Lipitor 40mg qHS.         VTE Risk Mitigation (From admission, onward)        Ordered     Place sequential compression device  Until discontinued      02/25/19 2258     Place ELIJAH hose  Until discontinued      02/25/19 2258     IP VTE HIGH RISK PATIENT  Once      02/25/19 2258              Katelynn Vasquez MD  Department of Hospital Medicine   [facility]

## 2019-02-27 NOTE — PT/OT/SLP EVAL
Physical Therapy Evaluation and Discharge Note    Patient Name:  Jesus Beckham   MRN:  [id number]    Recommendations:     Discharge Recommendations:  (return to NH )   Discharge Equipment Recommendations: none   Barriers to discharge: None    Assessment:     Jesus Beckham is a 89 y.o. male admitted with a medical diagnosis of Cellulitis of buttock, right. .  At this time, patient is functioning at their prior level of function and does not require further acute PT services.     Patient safe to ambulate in [place] with staff 3 x per day with RW and should  be up in their chair for every meal.     Place bed/chair alarm when pt is alone in room           Recent Surgery: * No surgery found *      Plan:     During this hospitalization, patient does not require further acute PT services.  Please re-consult if situation changes.      Subjective     Chief Complaint: none; pt pleasantly confused  Patient/Family Comments/goals: to return home and leave the hospital   Pain/Comfort:  · Pain Rating 1: 0/10  · Pain Rating Post-Intervention 1: 0/10  · Pain Rating Post-Intervention 2: 0/10    Patients cultural, spiritual, Yarsanism conflicts given the current situation: no    Living Environment:  Pt poor historian, per chart pt is a resident at a NH   Prior to admission, patients level of function was ambulating with rollator at baseline.  Equipment used at home: (facility equiptment, pt states he has rollator and w/c).  DME owned (not currently used): none.  Upon discharge, patient will have assistance from staff at NH.    Objective:     Communicated with RN  prior to session.  Patient found supine upon PT entry to room found with: telemetry     General Precautions: Standard, fall   Orthopedic Precautions:N/A   Braces: N/A     Exams:  · Cognitive Exam:  Patient is oriented to Person and Time  · Fine Motor Coordination: -       Intact  · Gross Motor Coordination:  WFL  · Postural Exam:  Patient presented with the following  abnormalities: -       Rounded shoulders  · -       Forward head  · Sensation: -       Intact  · Skin Integrity/Edema:  -       Skin integrity: Visible skin intact  · RLE ROM: WFL  · RLE Strength: WFL  · LLE ROM: WFL  · LLE Strength: WFL    Functional Mobility:  · Bed Mobility:  Rolling Right: supervision  · Supine to Sit: supervision  · Transfers:  Sit to Stand:  stand by assistance with rolling walker  · Bed to Chair: stand by assistance with  rolling walker  using  Step Transfer  · Gait: x 186 feet with RW with SBA; pt with decreased liss and step length but no overt LOB or increase in sway  · Balance: SBA for gait with RW for UE support; supervision for sitting balance    AM-PAC 6 CLICK MOBILITY  Total Score:20       Therapeutic Activities and Exercises:   Patient education  · Patient educated on the role of PT and POC  · Patient educated on importance  activity while in the hosptial per tolerance for improved endurance and to limit deconditioning   · Patient educated on safe transfers with nursing as appropriate  · Patient educated on proper transfer mechanics and safety  · All of patients questions were answered within the scope of PT        AM-PAC 6 CLICK MOBILITY  Total Score:20     Patient left up in chair with all lines intact, call button in reach, chair alarm on and RN  notified.    GOALS:   Multidisciplinary Problems     Physical Therapy Goals     Not on file          Multidisciplinary Problems (Resolved)        Problem: Physical Therapy Goal    Goal Priority Disciplines Outcome Goal Variances Interventions   Physical Therapy Goal   (Resolved)     PT, PT/OT Outcome(s) achieved                     History:     Past Medical History:   Diagnosis Date    Adjustment disorder with depressed mood     Arthritis     Atrioventricular block     Bipolar disorder     Bradycardia     Cancer     Cataract     OU    Coronary artery disease     Dementia     Depression     HEARING LOSS     High cholesterol      Hypertension     Orthostatic hypotension     Seizures        Past Surgical History:   Procedure Laterality Date    ADENOIDECTOMY      CARDIAC PACEMAKER PLACEMENT      COLONOSCOPY      INSERTION-PACEMAKER-DUAL Left 12/28/2015    Performed by [facility]iel [facility]illo [facility] at [facility] CATH LAB    TONSILLECTOMY         Time Tracking:     PT Received On: 02/27/19  PT Start Time: 0925     PT Stop Time: 0943  PT Total Time (min): 18 min     Billable Minutes: Evaluation 10 min and Therapeutic Activity 8 min      Manuel Gomez, PT  02/27/2019

## 2019-02-27 NOTE — ASSESSMENT & PLAN NOTE
- CT head with no acute abnormalities.  - CXR showed chronic interstitial changes versus mild interstitial edema.  Focal irregular shaped opacity in the left upper lung field at the inferior aspect of the cardiac pacing device, similar to the prior exam.  Unable to exclude pulmonary neoplasm cannot be excluded.  Radiology suggests follow-up with routine noncontrast chest CT if clinically appropriate.  - Right hip x-ray showed no evidence of fracture or dislocation.  - WBC 14.56, ESR 10, .6, CPK 75, lactic acid 1.2.  - Concern for cellulitis vs candidal intertrigo. Blood cultures pending (NGTD).  Patient refused PO antibiotics in the ED and was started on IV Ancef.  Will transition to PO clindamycin today in anticipation for discharge in am.   - Will consult PT, SW. Fall precautions, seizure, aspiration precautions.

## 2019-02-27 NOTE — PLAN OF CARE
Problem: Adult Inpatient Plan of Care  Goal: Plan of Care Review  Outcome: Ongoing (interventions implemented as appropriate)  VS stable overnight, afebrile, IV cephalexin given as ordered.   Bed alarm on, patient not comprehending the use of call bell, gets up the bed to go to the bathroom. Non slip socks on, toiletting offered during rounding, no fall or injury occurred as of this documentation.  Perenium cleaned and  Barrier cream applied plus  the antifungal powder. Waffle overlay put on, patient independent on changing position in bed. Kept dry and comfortable. Calm and restful night.

## 2019-02-27 NOTE — SUBJECTIVE & OBJECTIVE
Interval History: NAEON. No complaints this am. Doing well. Agreeable to discharge in am.     Review of Systems   Constitutional: Negative for chills and fever.   Respiratory: Negative for shortness of breath.    Cardiovascular: Negative for chest pain.   Gastrointestinal: Negative for constipation, diarrhea, nausea and vomiting.   Genitourinary: Negative for difficulty urinating and dysuria.   Musculoskeletal: Negative for neck pain and neck stiffness.   Skin: Positive for rash (groin, buttock).   Neurological: Negative for dizziness, light-headedness and headaches.   Psychiatric/Behavioral: Negative for agitation, behavioral problems and confusion.     Objective:     Vital Signs (Most Recent):  Temp: 98 °F (36.7 °C) (02/27/19 1131)  Pulse: 61 (02/27/19 1131)  Resp: 17 (02/27/19 1131)  BP: 130/60 (02/27/19 1131)  SpO2: (!) 94 % (02/27/19 1131) Vital Signs (24h Range):  Temp:  [96.3 °F (35.7 °C)-98 °F (36.7 °C)] 98 °F (36.7 °C)  Pulse:  [60-66] 61  Resp:  [16-18] 17  SpO2:  [94 %-97 %] 94 %  BP: (117-142)/(58-65) 130/60     Weight: 73.2 kg (161 lb 6 oz)  Body mass index is 25.28 kg/m².    Intake/Output Summary (Last 24 hours) at 2/27/2019 1402  Last data filed at 2/27/2019 0936  Gross per 24 hour   Intake 700 ml   Output --   Net 700 ml      Physical Exam   Constitutional: He appears well-developed and well-nourished. No distress.   HENT:   Head: Normocephalic and atraumatic.   Eyes: Pupils are equal, round, and reactive to light.   Neck: Neck supple. Carotid bruit is not present. No thyromegaly present.   Cardiovascular: Normal rate and regular rhythm. Exam reveals no gallop.   No murmur heard.  Pulmonary/Chest: Effort normal and breath sounds normal. No respiratory distress. He has no wheezes.   Abdominal: Bowel sounds are normal. He exhibits no distension. There is no splenomegaly or hepatomegaly. There is no tenderness.   Musculoskeletal: Normal range of motion. He exhibits no edema.   Neurological: He is alert.  No cranial nerve deficit or sensory deficit. GCS eye subscore is 4. GCS verbal subscore is 5. GCS motor subscore is 6.   Oriented to person, place, situation.   Skin: Skin is warm and dry. No rash noted.   Diffuse erythema noted in groin, buttock. Some skin sloughing. No ulceration, exudate, induration or fluctuance noted.   Psychiatric: He has a normal mood and affect. His behavior is normal.       Significant Labs:   CBC:   Recent Labs   Lab 02/25/19  1706 02/26/19  0411 02/27/19  0330   WBC 14.56* 10.25 7.53   HGB 15.0 13.1* 12.2*   HCT 44.5 39.2* 36.9*    198 204     CMP:   Recent Labs   Lab 02/25/19  1706 02/26/19  0411 02/27/19  0330    142 139   K 3.8 3.7 4.0    110 109   CO2 24 20* 22*   * 86 83   BUN 18 17 13   CREATININE 1.1 0.9 0.8   CALCIUM 9.2 8.3* 8.2*   PROT 7.9 6.6 6.3   ALBUMIN 3.5 2.9* 2.7*   BILITOT 0.5 0.5 0.3   ALKPHOS 133 114 100   AST 18 14 19   ALT 14 10 9*   ANIONGAP 11 12 8   EGFRNONAA 58.8* >60.0 >60.0       Significant Imaging: I have reviewed and interpreted all pertinent imaging results/findings within the past 24 hours.

## 2019-02-27 NOTE — PLAN OF CARE
Problem: Adult Inpatient Plan of Care  Goal: Rounds/Family Conference  Outcome: Ongoing (interventions implemented as appropriate)  Pt very pleasant. A/O to self but reorients very well.  Lungs CTA on RA. No cough noted.  HRR. VSS. No edema.  Abdomen soft, non-tender. No BM this shift.  Pt voiding per BR.  Pt denies pain.  Perineum continues to be red with peeling skin. Cleansed with greta wipe & applied miconazole powder & antifungal/barrier cream per wound RN rec.  IV abx changed to PO. Plan to d/c back to [facility] tomorrow.

## 2019-02-28 VITALS
WEIGHT: 161.38 LBS | BODY MASS INDEX: 25.33 KG/M2 | DIASTOLIC BLOOD PRESSURE: 69 MMHG | SYSTOLIC BLOOD PRESSURE: 138 MMHG | RESPIRATION RATE: 18 BRPM | HEIGHT: 67 IN | TEMPERATURE: 98 F | HEART RATE: 75 BPM | OXYGEN SATURATION: 99 %

## 2019-02-28 LAB
ALBUMIN SERPL BCP-MCNC: 2.7 G/DL
ALP SERPL-CCNC: 98 U/L
ALT SERPL W/O P-5'-P-CCNC: 6 U/L
ANION GAP SERPL CALC-SCNC: 8 MMOL/L
AST SERPL-CCNC: 17 U/L
BASOPHILS # BLD AUTO: 0.04 K/UL
BASOPHILS NFR BLD: 0.6 %
BILIRUB SERPL-MCNC: 0.4 MG/DL
BUN SERPL-MCNC: 10 MG/DL
CALCIUM SERPL-MCNC: 8.1 MG/DL
CHLORIDE SERPL-SCNC: 108 MMOL/L
CO2 SERPL-SCNC: 23 MMOL/L
CREAT SERPL-MCNC: 0.8 MG/DL
DIFFERENTIAL METHOD: ABNORMAL
EOSINOPHIL # BLD AUTO: 0.3 K/UL
EOSINOPHIL NFR BLD: 4.1 %
ERYTHROCYTE [DISTWIDTH] IN BLOOD BY AUTOMATED COUNT: 13.1 %
EST. GFR  (AFRICAN AMERICAN): >60 ML/MIN/1.73 M^2
EST. GFR  (NON AFRICAN AMERICAN): >60 ML/MIN/1.73 M^2
GLUCOSE SERPL-MCNC: 80 MG/DL
HCT VFR BLD AUTO: 38 %
HGB BLD-MCNC: 12.7 G/DL
IMM GRANULOCYTES # BLD AUTO: 0.02 K/UL
IMM GRANULOCYTES NFR BLD AUTO: 0.3 %
LYMPHOCYTES # BLD AUTO: 2.3 K/UL
LYMPHOCYTES NFR BLD: 32.9 %
MCH RBC QN AUTO: 32.2 PG
MCHC RBC AUTO-ENTMCNC: 33.4 G/DL
MCV RBC AUTO: 96 FL
MONOCYTES # BLD AUTO: 0.9 K/UL
MONOCYTES NFR BLD: 12.9 %
NEUTROPHILS # BLD AUTO: 3.4 K/UL
NEUTROPHILS NFR BLD: 49.2 %
NRBC BLD-RTO: 0 /100 WBC
PLATELET # BLD AUTO: 200 K/UL
PMV BLD AUTO: 10.8 FL
POTASSIUM SERPL-SCNC: 4.3 MMOL/L
PROT SERPL-MCNC: 6.2 G/DL
RBC # BLD AUTO: 3.95 M/UL
SODIUM SERPL-SCNC: 139 MMOL/L
WBC # BLD AUTO: 6.89 K/UL

## 2019-02-28 PROCEDURE — 85025 COMPLETE CBC W/AUTO DIFF WBC: CPT

## 2019-02-28 PROCEDURE — 99217 PR OBSERVATION CARE DISCHARGE: ICD-10-PCS | Mod: ,,, | Performed by: HOSPITALIST

## 2019-02-28 PROCEDURE — G0378 HOSPITAL OBSERVATION PER HR: HCPCS

## 2019-02-28 PROCEDURE — 25000003 PHARM REV CODE 250: Performed by: HOSPITALIST

## 2019-02-28 PROCEDURE — G0009 ADMIN PNEUMOCOCCAL VACCINE: HCPCS | Performed by: HOSPITALIST

## 2019-02-28 PROCEDURE — 99217 PR OBSERVATION CARE DISCHARGE: CPT | Mod: ,,, | Performed by: HOSPITALIST

## 2019-02-28 PROCEDURE — 25000003 PHARM REV CODE 250: Performed by: PHYSICIAN ASSISTANT

## 2019-02-28 PROCEDURE — 36415 COLL VENOUS BLD VENIPUNCTURE: CPT

## 2019-02-28 PROCEDURE — 63600175 PHARM REV CODE 636 W HCPCS: Performed by: HOSPITALIST

## 2019-02-28 PROCEDURE — 90670 PCV13 VACCINE IM: CPT | Performed by: HOSPITALIST

## 2019-02-28 PROCEDURE — 80053 COMPREHEN METABOLIC PANEL: CPT

## 2019-02-28 PROCEDURE — 90471 IMMUNIZATION ADMIN: CPT | Performed by: HOSPITALIST

## 2019-02-28 RX ORDER — CLINDAMYCIN HYDROCHLORIDE 300 MG/1
300 CAPSULE ORAL EVERY 6 HOURS
Start: 2019-02-28 | End: 2019-03-03

## 2019-02-28 RX ORDER — MICONAZOLE NITRATE 2 %
POWDER (GRAM) TOPICAL 2 TIMES DAILY
Refills: 0 | COMMUNITY
Start: 2019-02-28

## 2019-02-28 RX ADMIN — CLINDAMYCIN HYDROCHLORIDE 300 MG: 150 CAPSULE ORAL at 05:02

## 2019-02-28 RX ADMIN — PNEUMOCOCCAL 13-VALENT CONJUGATE VACCINE 0.5 ML: 2.2; 2.2; 2.2; 2.2; 2.2; 4.4; 2.2; 2.2; 2.2; 2.2; 2.2; 2.2; 2.2 INJECTION, SUSPENSION INTRAMUSCULAR at 05:02

## 2019-02-28 RX ADMIN — THERA TABS 1 TABLET: TAB at 09:02

## 2019-02-28 RX ADMIN — CALCIUM 500 MG: 500 TABLET ORAL at 09:02

## 2019-02-28 RX ADMIN — CLINDAMYCIN HYDROCHLORIDE 300 MG: 150 CAPSULE ORAL at 06:02

## 2019-02-28 RX ADMIN — TAMSULOSIN HYDROCHLORIDE 0.4 MG: 0.4 CAPSULE ORAL at 09:02

## 2019-02-28 RX ADMIN — CLINDAMYCIN HYDROCHLORIDE 300 MG: 150 CAPSULE ORAL at 01:02

## 2019-02-28 RX ADMIN — MICONAZOLE NITRATE: 20 POWDER TOPICAL at 09:02

## 2019-02-28 RX ADMIN — EXTENDED PHENYTOIN SODIUM 150 MG: 30 CAPSULE ORAL at 09:02

## 2019-02-28 RX ADMIN — LACTULOSE 10 G: 20 SOLUTION ORAL at 10:02

## 2019-02-28 RX ADMIN — STANDARDIZED SENNA CONCENTRATE AND DOCUSATE SODIUM 1 TABLET: 8.6; 5 TABLET, FILM COATED ORAL at 09:02

## 2019-02-28 RX ADMIN — LEVETIRACETAM 500 MG: 500 TABLET ORAL at 09:02

## 2019-02-28 NOTE — PLAN OF CARE
[facility]     Department of Hospital Medicine     [address] [place] [address]     [phone] after hours  [phone] fax       NURSING HOME ORDERS    02/28/2019    Admit to Nursing Home:  Regular Bed       Diagnoses:  Active Hospital Problems    Diagnosis  POA    *Cellulitis of buttock, right [L03.317]  Yes    Essential hypertension [I10]  Yes     Chronic    Coronary artery disease involving native coronary artery of native heart without angina pectoris [I25.10]  Yes     Chronic    Cardiac pacemaker in situ [Z95.0]  Yes     Chronic    Seizure disorder [G40.909]  Yes     Chronic    Dementia [F03.90]  Yes     Chronic    Dyslipidemia [E78.5]  Yes     Chronic      Resolved Hospital Problems   No resolved problems to display.       Patient is homebound due to:  Cellulitis of buttock, right    Allergies:Review of patient's allergies indicates:  No Known Allergies    Vitals:      Routine, once monthly      Diet:   Supplement:  1 can every three times a day with meals                         Type:  House    Ensure    Glucerna   Nepro        Acitivities:     - Up in a chair each morning as tolerated   - Ambulate with assistance to bathroom    LABS:  Per facility protocol    Nursing Precautions:    - Aspiration precautions:             - Total assistance with meals            -  Upright 90 degrees befor during and after meals             -  Suction at bedside          - Fall precautions per nursing home protocol   - Seizure precaution per [facility] protocol   - Decubitus precautions:        -  for positioning   - Pressure reducing foam mattress   - Turn patient every two hours. Use wedge pillows to anchor patient    CONSULTS:      Physical Therapy to evaluate and treat     Occupational Therapy to evaluate and treat    MISCELLANEOUS CARE:        Routine Skin for Bedridden Patients:  Apply moisture barrier cream to all    skin folds and wet areas in  perineal area daily and after baths and                           all bowel movements.    -Nursing to cleanse perineum, buttocks, bilateral inner thighs, and groin with cleansing cloths and apply antifungal powder or ointment BID and PRN cleaning. Critic Aid moisture barrier may be used in conjunction with antifungal treatment as needed.  -Nursing to continue pressure injury prevention interventions to include repositioning with wedge pillow, heel protectors and waffle cushion overlay.        Medications: Discontinue all previous medication orders, if any. See new list below.     Jesus Beckham   Home Medication Instructions NEL:[id number]    Printed on:02/28/19 4176   Medication Information                      acetaminophen (TYLENOL) 500 MG tablet  Take 500 mg by mouth every 6 (six) hours as needed for Pain.              ascorbic acid (VITAMIN C) 500 MG tablet  Take 500 mg by mouth every evening.             atorvastatin (LIPITOR) 40 MG tablet  Take 40 mg by mouth every evening.             bisacodyl (DULCOLAX, BISACODYL,) 10 mg Supp  Place 10 mg rectally daily as needed (constipation).              CALCIUM CARBONATE (CALCIUM 600 ORAL)  Take 2 tablets by mouth once daily.             carvedilol (COREG) 3.125 MG tablet  Take 3.125 mg by mouth 2 (two) times daily.             chlorhexidine (PERIDEX) 0.12 % solution  Swish and spit every evening             clindamycin (CLEOCIN) 300 MG capsule  Take 1 capsule (300 mg total) by mouth every 6 (six) hours. for 3 days  Stop date: 3/3           clotrimazole-betamethasone 1-0.05% (LOTRISONE) cream  Apply to facial rash twice daily             donepezil (ARICEPT) 5 MG tablet  Take 5 mg by mouth every evening.             lactulose (CHRONULAC) 10 gram/15 mL solution  Take 10 g by mouth once daily.             levetiracetam (KEPPRA) 500 MG Tab  Take 1 tablet (500 mg total) by mouth 2 (two) times daily. 1 Tablet Oral Twice a day             linaclotide (LINZESS) 72 mcg  Cap  Take 1 tablet by mouth once daily             miconazole NITRATE 2 % (MICOTIN) 2 % top powder  Apply topically 2 (two) times daily.             multivitamin (THERAGRAN) per tablet  Take 1 tablet by mouth once daily.             phenytoin (DILANTIN) 30 MG ER capsule  Take 5 capsules (150 mg total) by mouth 2 (two) times daily. Dose decreased on 09/23/2018.  Please draw phenytoin level 30 minutes PRIOR to the AM dose on 10/01/2018 and adjust dose accordingly. Goal total phenytoin level adjusted for albumin 10-20 mg/L.             potassium chloride SA (K-DUR,KLOR-CON) 20 MEQ tablet  Take 20 mEq by mouth every evening.             tamsulosin (FLOMAX) 0.4 mg Cp24  Take 1 capsule (0.4 mg total) by mouth once daily.                       _________________________________  Katelynn Vasquez MD  02/28/2019

## 2019-02-28 NOTE — PLAN OF CARE
SW spoke to admissions at [facility], and was provided number for report. Nurse can call report to [phone] and ask for the nurse for room 315 B.     CHUCK setup wheelchair transport via [facility] for 5:00pm. Call [phone] for questions regarding transport.    Primo Rich, PARAG  [facility]  [id number]

## 2019-02-28 NOTE — PLAN OF CARE
Problem: Adult Inpatient Plan of Care  Goal: Plan of Care Review  Outcome: Ongoing (interventions implemented as appropriate)  Patient AAOX1 vital signs stable. Safety and infection precautions maintained. Patient is comfortable at this time,bed is locked and in a low position with call light in reach. Will cont to monitor.

## 2019-03-02 LAB
BACTERIA BLD CULT: NORMAL
BACTERIA BLD CULT: NORMAL

## 2019-03-08 NOTE — HOSPITAL COURSE
The following problems were managed IP:    Cellulitis of buttock, right     - CT head with no acute abnormalities.  - CXR showed chronic interstitial changes versus mild interstitial edema.  Focal irregular shaped opacity in the left upper lung field at the inferior aspect of the cardiac pacing device, similar to the prior exam.  Unable to exclude pulmonary neoplasm cannot be excluded.  Radiology suggests follow-up with routine noncontrast chest CT if clinically appropriate.  - Right hip x-ray showed no evidence of fracture or dislocation.  - WBC 14.56, ESR 10, .6, CPK 75, lactic acid 1.2.  - Concern for cellulitis vs candidal intertrigo. Blood cultures pending (NGTD).  Patient refused PO antibiotics in the ED and was started on IV Ancef. Successfully transitioned to PO clindamycin prior to discharge.      Essential hypertension     - Continued Coreg 3.125mg BID.         Cardiac pacemaker in situ     H/o Complete heart block     - Stable.  Continued home therapy.      Coronary artery disease involving native coronary artery of native heart without angina pectoris     - Continued secondary prevention.         Dementia     - Continued Aricept 5mg qHS.  - Patient oriented to person only.  - Delirium precautions in place.          Seizure disorder     - Continued Keppra 500mg BID and Dilantin 150mg BID.  - Dilantin levels were subtherapeutic. On chart review, dose was recently decreased due to supratherapeutic levels previously. Unclear regarding whether patient received this medication as scheduled prior to arrival. Discussed with Neurology who recommended against titrating while in house due to fluctuations in levels in hospitalized patients and recommended patient follow up with his regular neurologist.   - Continued seizure precautions.          Dyslipidemia     - Continued Lipitor 40mg qHS.

## 2019-03-08 NOTE — DISCHARGE SUMMARY
[facility] Hospital Medicine  Discharge Summary      Patient Name: Jesus Beckham  MRN: [id number]  Admission Date: 2/25/2019  Hospital Length of Stay: 0 days  Discharge Date and Time: 2/28/2019  8:00 PM  Attending Physician: No att. providers found   Discharging Provider: Katelynn Vasquez MD  Primary Care Provider: Tulio Tovar MD  [facility] R Katelynn Vasquez MD    HPI:   Patient is a 89 year old gentleman with a h/o dementia, HTN, CAD, HLD, seizures, complete heart block s/p PPM.  He presents from [facility] after a fall yesterday.  Patient is a poor historian and only oriented to person.  HPI obtained from past medical records.  Per EMS, patient was found down yesterday morning after an unknown period of time by NH staff.  He was able to get back up with assistance and perform his usual ADLs.  This morning, however, patient would not get out of bed, which is out of character for him.  Patient denies pain, trouble breathing.      * No surgery found *      Hospital Course:   The following problems were managed IP:    Cellulitis of buttock, right     - CT head with no acute abnormalities.  - CXR showed chronic interstitial changes versus mild interstitial edema.  Focal irregular shaped opacity in the left upper lung field at the inferior aspect of the cardiac pacing device, similar to the prior exam.  Unable to exclude pulmonary neoplasm cannot be excluded.  Radiology suggests follow-up with routine noncontrast chest CT if clinically appropriate.  - Right hip x-ray showed no evidence of fracture or dislocation.  - WBC 14.56, ESR 10, .6, CPK 75, lactic acid 1.2.  - Concern for cellulitis vs candidal intertrigo. Blood cultures pending (NGTD).  Patient refused PO antibiotics in the ED and was started on IV Ancef. Successfully transitioned to PO clindamycin prior to discharge.      Essential hypertension     - Continued Coreg 3.125mg  BID.         Cardiac pacemaker in situ     H/o Complete heart block     - Stable.  Continue d home therapy.      Coronary artery disease involving native coronary artery of native heart without angina pectoris     - Continued secondary prevention.         Dementia     - Continued Aricept 5mg qHS.  - Patient oriented to person only.  - Delirium precautions in place.          Seizure disorder     - Continued Keppra 500mg BID and Dilantin 150mg BID.  - Dilantin levels were subtherapeutic. On chart review, dose was recently decreased due to supratherapeutic levels previously. Unclear regarding whether patient received this medication as scheduled prior to arrival. Discussed with Neurology who recommended against titrating while in house due to fluctuations in levels in hospitalized patients and recommended patient follow up with his regular neurologist.   - Continued seizure precautions.          Dyslipidemia     - Continued Lipitor 40mg qHS.            Consults:     No new Assessment & Plan notes have been filed under this hospital service since the last note was generated.  Service: Hospital Medicine    Final Active Diagnoses:    Diagnosis Date Noted POA    PRINCIPAL PROBLEM:  Cellulitis of buttock, right [L03.317] 02/25/2019 Yes    Essential hypertension [I10] 02/25/2019 Yes     Chronic    Coronary artery disease involving native coronary artery of native heart without angina pectoris [I25.10] 01/05/2016 Yes     Chronic    Cardiac pacemaker in situ [Z95.0] 12/28/2015 Yes     Chronic    Seizure disorder [G40.909] 11/27/2012 Yes     Chronic    Dementia [F03.90] 11/27/2012 Yes     Chronic    Dyslipidemia [E78.5] 03/13/2012 Yes     Chronic      Problems Resolved During this Admission:       Discharged Condition: stable    Disposition: Intermediate Care Facili*    Follow Up:    Patient Instructions:   No discharge procedures on file.    Significant Diagnostic Studies: See MR    Pending Diagnostic Studies:     None          Medications:  Reconciled Home Medications:      Medication List      START taking these medications    miconazole NITRATE 2 % 2 % top powder  Commonly known as:  MICOTIN  Apply topically 2 (two) times daily.        CHANGE how you take these medications    levETIRAcetam 500 MG Tab  Commonly known as:  KEPPRA  Take 1 tablet (500 mg total) by mouth 2 (two) times daily. 1 Tablet Oral Twice a day  What changed:  additional instructions        CONTINUE taking these medications    acetaminophen 500 MG tablet  Commonly known as:  TYLENOL  Take 500 mg by mouth every 6 (six) hours as needed for Pain.     ascorbic acid (vitamin C) 500 MG tablet  Commonly known as:  VITAMIN C  Take 500 mg by mouth every evening.     atorvastatin 40 MG tablet  Commonly known as:  LIPITOR  Take 40 mg by mouth every evening.     CALCIUM 600 ORAL  Take 2 tablets by mouth once daily.     carvedilol 3.125 MG tablet  Commonly known as:  COREG  Take 3.125 mg by mouth 2 (two) times daily.     chlorhexidine 0.12 % solution  Commonly known as:  PERIDEX  Swish and spit every evening     donepezil 5 MG tablet  Commonly known as:  ARICEPT  Take 5 mg by mouth every evening.     DULCOLAX (BISACODYL) 10 mg Supp  Generic drug:  bisacodyl  Place 10 mg rectally daily as needed (constipation).     lactulose 10 gram/15 mL solution  Commonly known as:  CHRONULAC  Take 10 g by mouth once daily.     LINZESS 72 mcg Cap  Generic drug:  linaclotide  Take 1 tablet by mouth once daily     LOTRISONE cream  Generic drug:  clotrimazole-betamethasone 1-0.05%  Apply to facial rash twice daily     multivitamin per tablet  Commonly known as:  THERAGRAN  Take 1 tablet by mouth once daily.     phenytoin 30 MG ER capsule  Commonly known as:  DILANTIN  Take 5 capsules (150 mg total) by mouth 2 (two) times daily. Dose decreased on 09/23/2018.  Please draw phenytoin level 30 minutes PRIOR to the AM dose on 10/01/2018 and adjust dose accordingly. Goal total phenytoin level  adjusted for albumin 10-20 mg/L.     potassium chloride SA 20 MEQ tablet  Commonly known as:  K-DUR,KLOR-CON  Take 20 mEq by mouth every evening.     tamsulosin 0.4 mg Cap  Commonly known as:  FLOMAX  Take 1 capsule (0.4 mg total) by mouth once daily.        ASK your doctor about these medications    clindamycin 300 MG capsule  Commonly known as:  CLEOCIN  Take 1 capsule (300 mg total) by mouth every 6 (six) hours. for 3 days  Ask about: Should I take this medication?            Indwelling Lines/Drains at time of discharge:   Lines/Drains/Airways     Pressure Ulcer                 Pressure Injury 09/20/18 2030 other (see comments) Stage 1 168 days                Time spent on the discharge of patient: 35 minutes  Patient was seen and examined on the date of discharge and determined to be suitable for discharge.         Katelynn Vasquez MD  Department of Hospital Medicine  [facility]

## 2023-11-13 NOTE — ASSESSMENT & PLAN NOTE
[facility] phone call message- medication clarification/question:    Full Medication Name:     griseofulvin ultramicrosize (SUZI-PEG) 125 MG tablet   Sig - Route: Take 1 tablet (125 mg) by mouth daily for 42 days - Oral       cetirizine (ZYRTEC) 5 MG tablet   Sig - Route: Take 0.5 tablets (2.5 mg) by mouth daily as needed for allergies or rhinitis - Oral         Question:     Insurance does not cover for one of these meds above. Grandfather called to let pt doctors know insurance need PA for one of these meds. Grandfather remember pharmacy mention its a tablet       Pharmacy confirmed as [id number] - [place], [place] - [address]: Yes    OK to leave a message on voice mail? Yes    Primary language: English      needed? No    Call taken on November 13, 2023 at 11:05 AM by Teresa Hernández   Patient more lethargic than baseline per nursing home staff. Differential includes progression of dementia, trauma, medication side effect, toxicity, CVA. No metabolic or new structural abnormalities noted.

## 2023-12-07 NOTE — ASSESSMENT & PLAN NOTE
Patient more lethargic than baseline per nursing home staff. Differential includes progression of dementia, trauma, medication side effect, toxicity, CVA. No metabolic or new structural abnormalities noted.   Detail Level: Detailed Depth Of Biopsy: dermis Was A Bandage Applied: Yes Size Of Lesion In Cm: 0.1 X Size Of Lesion In Cm: 0 Biopsy Type: H and E Biopsy Method: Dermablade Anesthesia Type: 1% lidocaine with epinephrine Anesthesia Volume In Cc: 0.5 Hemostasis: Aluminum Chloride Wound Care: Petrolatum Dressing: bandage Destruction After The Procedure: No Type Of Destruction Used: Curettage Curettage Text: The wound bed was treated with curettage after the biopsy was performed. Cryotherapy Text: The wound bed was treated with cryotherapy after the biopsy was performed. Electrodesiccation Text: The wound bed was treated with electrodesiccation after the biopsy was performed. Electrodesiccation And Curettage Text: The wound bed was treated with electrodesiccation and curettage after the biopsy was performed. Silver Nitrate Text: The wound bed was treated with silver nitrate after the biopsy was performed. Lab: -[id number] Lab Facility: 3 Consent: Written consent was obtained and risks were reviewed including but not limited to scarring, infection, bleeding, scabbing, incomplete removal, nerve damage and allergy to anesthesia. Post-Care Instructions: I reviewed with the patient in detail post-care instructions. Patient is to keep the biopsy site dry overnight, and then apply bacitracin twice daily until healed. Patient may apply hydrogen peroxide soaks to remove any crusting. Notification Instructions: Patient will be notified of biopsy results. However, patient instructed to call the office if not contacted within 2 weeks. Billing Type: Third-Party Bill Information: Selecting Yes will display possible errors in your note based on the variables you have selected. This validation is only offered as a suggestion for you. PLEASE NOTE THAT THE VALIDATION TEXT WILL BE REMOVED WHEN YOU FINALIZE YOUR NOTE. IF YOU WANT TO FAX A PRELIMINARY NOTE YOU WILL NEED TO TOGGLE THIS TO 'NO' IF YOU DO NOT WANT IT IN YOUR FAXED NOTE. Size Of Lesion In Cm: 0.2